# Patient Record
Sex: FEMALE | Race: WHITE | NOT HISPANIC OR LATINO | URBAN - METROPOLITAN AREA
[De-identification: names, ages, dates, MRNs, and addresses within clinical notes are randomized per-mention and may not be internally consistent; named-entity substitution may affect disease eponyms.]

---

## 2018-10-23 ENCOUNTER — EMERGENCY (EMERGENCY)
Facility: HOSPITAL | Age: 18
LOS: 1 days | Discharge: HOME | End: 2018-10-23
Attending: EMERGENCY MEDICINE | Admitting: EMERGENCY MEDICINE
Payer: MEDICAID

## 2018-10-23 ENCOUNTER — OUTPATIENT (OUTPATIENT)
Dept: OUTPATIENT SERVICES | Facility: HOSPITAL | Age: 18
LOS: 1 days | End: 2018-10-23

## 2018-10-23 VITALS
OXYGEN SATURATION: 100 % | TEMPERATURE: 98 F | RESPIRATION RATE: 16 BRPM | HEART RATE: 100 BPM | DIASTOLIC BLOOD PRESSURE: 81 MMHG | SYSTOLIC BLOOD PRESSURE: 135 MMHG

## 2018-10-23 VITALS
DIASTOLIC BLOOD PRESSURE: 36 MMHG | SYSTOLIC BLOOD PRESSURE: 131 MMHG | HEART RATE: 108 BPM | RESPIRATION RATE: 16 BRPM | TEMPERATURE: 98 F | OXYGEN SATURATION: 100 %

## 2018-10-23 DIAGNOSIS — O26.899 OTHER SPECIFIED PREGNANCY RELATED CONDITIONS, UNSPECIFIED TRIMESTER: ICD-10-CM

## 2018-10-23 DIAGNOSIS — Z3A.00 WEEKS OF GESTATION OF PREGNANCY NOT SPECIFIED: ICD-10-CM

## 2018-10-23 LAB
ALBUMIN SERPL ELPH-MCNC: 3.4 G/DL — SIGNIFICANT CHANGE UP (ref 3.3–5)
ALP SERPL-CCNC: 128 U/L — HIGH (ref 40–120)
ALT FLD-CCNC: 20 U/L — SIGNIFICANT CHANGE UP (ref 4–33)
APPEARANCE UR: CLEAR — SIGNIFICANT CHANGE UP
AST SERPL-CCNC: 17 U/L — SIGNIFICANT CHANGE UP (ref 4–32)
B PERT DNA SPEC QL NAA+PROBE: SIGNIFICANT CHANGE UP
BACTERIA # UR AUTO: NEGATIVE — SIGNIFICANT CHANGE UP
BASOPHILS # BLD AUTO: 0.02 K/UL — SIGNIFICANT CHANGE UP (ref 0–0.2)
BASOPHILS NFR BLD AUTO: 0.2 % — SIGNIFICANT CHANGE UP (ref 0–2)
BILIRUB SERPL-MCNC: 0.2 MG/DL — SIGNIFICANT CHANGE UP (ref 0.2–1.2)
BILIRUB UR-MCNC: NEGATIVE — SIGNIFICANT CHANGE UP
BLOOD UR QL VISUAL: NEGATIVE — SIGNIFICANT CHANGE UP
BUN SERPL-MCNC: 5 MG/DL — LOW (ref 7–23)
C PNEUM DNA SPEC QL NAA+PROBE: NOT DETECTED — SIGNIFICANT CHANGE UP
CALCIUM SERPL-MCNC: 9 MG/DL — SIGNIFICANT CHANGE UP (ref 8.4–10.5)
CHLORIDE SERPL-SCNC: 100 MMOL/L — SIGNIFICANT CHANGE UP (ref 98–107)
CO2 SERPL-SCNC: 22 MMOL/L — SIGNIFICANT CHANGE UP (ref 22–31)
COLOR SPEC: SIGNIFICANT CHANGE UP
CREAT SERPL-MCNC: 0.45 MG/DL — LOW (ref 0.5–1.3)
EOSINOPHIL # BLD AUTO: 0.07 K/UL — SIGNIFICANT CHANGE UP (ref 0–0.5)
EOSINOPHIL NFR BLD AUTO: 0.6 % — SIGNIFICANT CHANGE UP (ref 0–6)
FLUAV H1 2009 PAND RNA SPEC QL NAA+PROBE: NOT DETECTED — SIGNIFICANT CHANGE UP
FLUAV H1 RNA SPEC QL NAA+PROBE: NOT DETECTED — SIGNIFICANT CHANGE UP
FLUAV H3 RNA SPEC QL NAA+PROBE: NOT DETECTED — SIGNIFICANT CHANGE UP
FLUAV SUBTYP SPEC NAA+PROBE: SIGNIFICANT CHANGE UP
FLUBV RNA SPEC QL NAA+PROBE: NOT DETECTED — SIGNIFICANT CHANGE UP
GLUCOSE SERPL-MCNC: 111 MG/DL — HIGH (ref 70–99)
GLUCOSE UR-MCNC: NEGATIVE — SIGNIFICANT CHANGE UP
HADV DNA SPEC QL NAA+PROBE: NOT DETECTED — SIGNIFICANT CHANGE UP
HCOV 229E RNA SPEC QL NAA+PROBE: NOT DETECTED — SIGNIFICANT CHANGE UP
HCOV HKU1 RNA SPEC QL NAA+PROBE: NOT DETECTED — SIGNIFICANT CHANGE UP
HCOV NL63 RNA SPEC QL NAA+PROBE: NOT DETECTED — SIGNIFICANT CHANGE UP
HCOV OC43 RNA SPEC QL NAA+PROBE: NOT DETECTED — SIGNIFICANT CHANGE UP
HCT VFR BLD CALC: 35 % — SIGNIFICANT CHANGE UP (ref 34.5–45)
HGB BLD-MCNC: 11.3 G/DL — LOW (ref 11.5–15.5)
HMPV RNA SPEC QL NAA+PROBE: NOT DETECTED — SIGNIFICANT CHANGE UP
HPIV1 RNA SPEC QL NAA+PROBE: NOT DETECTED — SIGNIFICANT CHANGE UP
HPIV2 RNA SPEC QL NAA+PROBE: NOT DETECTED — SIGNIFICANT CHANGE UP
HPIV3 RNA SPEC QL NAA+PROBE: NOT DETECTED — SIGNIFICANT CHANGE UP
HPIV4 RNA SPEC QL NAA+PROBE: NOT DETECTED — SIGNIFICANT CHANGE UP
HYALINE CASTS # UR AUTO: NEGATIVE — SIGNIFICANT CHANGE UP
IMM GRANULOCYTES # BLD AUTO: 0.05 # — SIGNIFICANT CHANGE UP
IMM GRANULOCYTES NFR BLD AUTO: 0.4 % — SIGNIFICANT CHANGE UP (ref 0–1.5)
KETONES UR-MCNC: NEGATIVE — SIGNIFICANT CHANGE UP
LEUKOCYTE ESTERASE UR-ACNC: NEGATIVE — SIGNIFICANT CHANGE UP
LYMPHOCYTES # BLD AUTO: 1.57 K/UL — SIGNIFICANT CHANGE UP (ref 1–3.3)
LYMPHOCYTES # BLD AUTO: 13.8 % — SIGNIFICANT CHANGE UP (ref 13–44)
M PNEUMO DNA SPEC QL NAA+PROBE: NOT DETECTED — SIGNIFICANT CHANGE UP
MCHC RBC-ENTMCNC: 26 PG — LOW (ref 27–34)
MCHC RBC-ENTMCNC: 32.3 % — SIGNIFICANT CHANGE UP (ref 32–36)
MCV RBC AUTO: 80.5 FL — SIGNIFICANT CHANGE UP (ref 80–100)
MONOCYTES # BLD AUTO: 0.6 K/UL — SIGNIFICANT CHANGE UP (ref 0–0.9)
MONOCYTES NFR BLD AUTO: 5.3 % — SIGNIFICANT CHANGE UP (ref 2–14)
NEUTROPHILS # BLD AUTO: 9.1 K/UL — HIGH (ref 1.8–7.4)
NEUTROPHILS NFR BLD AUTO: 79.7 % — HIGH (ref 43–77)
NITRITE UR-MCNC: NEGATIVE — SIGNIFICANT CHANGE UP
NRBC # FLD: 0 — SIGNIFICANT CHANGE UP
PH UR: 6.5 — SIGNIFICANT CHANGE UP (ref 5–8)
PLATELET # BLD AUTO: 345 K/UL — SIGNIFICANT CHANGE UP (ref 150–400)
PMV BLD: 10.4 FL — SIGNIFICANT CHANGE UP (ref 7–13)
POTASSIUM SERPL-MCNC: 4.3 MMOL/L — SIGNIFICANT CHANGE UP (ref 3.5–5.3)
POTASSIUM SERPL-SCNC: 4.3 MMOL/L — SIGNIFICANT CHANGE UP (ref 3.5–5.3)
PROT SERPL-MCNC: 7 G/DL — SIGNIFICANT CHANGE UP (ref 6–8.3)
PROT UR-MCNC: NEGATIVE — SIGNIFICANT CHANGE UP
RBC # BLD: 4.35 M/UL — SIGNIFICANT CHANGE UP (ref 3.8–5.2)
RBC # FLD: 12.9 % — SIGNIFICANT CHANGE UP (ref 10.3–14.5)
RBC CASTS # UR COMP ASSIST: SIGNIFICANT CHANGE UP (ref 0–?)
RSV RNA SPEC QL NAA+PROBE: NOT DETECTED — SIGNIFICANT CHANGE UP
RV+EV RNA SPEC QL NAA+PROBE: POSITIVE — HIGH
SODIUM SERPL-SCNC: 136 MMOL/L — SIGNIFICANT CHANGE UP (ref 135–145)
SP GR SPEC: 1.01 — SIGNIFICANT CHANGE UP (ref 1–1.04)
SQUAMOUS # UR AUTO: SIGNIFICANT CHANGE UP
UROBILINOGEN FLD QL: NORMAL — SIGNIFICANT CHANGE UP
WBC # BLD: 11.41 K/UL — HIGH (ref 3.8–10.5)
WBC # FLD AUTO: 11.41 K/UL — HIGH (ref 3.8–10.5)
WBC UR QL: SIGNIFICANT CHANGE UP (ref 0–?)

## 2018-10-23 PROCEDURE — 99284 EMERGENCY DEPT VISIT MOD MDM: CPT

## 2018-10-23 RX ORDER — IPRATROPIUM/ALBUTEROL SULFATE 18-103MCG
3 AEROSOL WITH ADAPTER (GRAM) INHALATION ONCE
Refills: 0 | Status: COMPLETED | OUTPATIENT
Start: 2018-10-23 | End: 2018-10-23

## 2018-10-23 RX ORDER — SODIUM CHLORIDE 9 MG/ML
1000 INJECTION INTRAMUSCULAR; INTRAVENOUS; SUBCUTANEOUS ONCE
Refills: 0 | Status: COMPLETED | OUTPATIENT
Start: 2018-10-23 | End: 2018-10-23

## 2018-10-23 RX ORDER — ALBUTEROL 90 UG/1
1 AEROSOL, METERED ORAL ONCE
Refills: 0 | Status: COMPLETED | OUTPATIENT
Start: 2018-10-23 | End: 2018-10-23

## 2018-10-23 RX ORDER — ACETAMINOPHEN 500 MG
650 TABLET ORAL ONCE
Refills: 0 | Status: COMPLETED | OUTPATIENT
Start: 2018-10-23 | End: 2018-10-23

## 2018-10-23 RX ADMIN — Medication 75 MILLIGRAM(S): at 09:46

## 2018-10-23 RX ADMIN — SODIUM CHLORIDE 2000 MILLILITER(S): 9 INJECTION INTRAMUSCULAR; INTRAVENOUS; SUBCUTANEOUS at 09:46

## 2018-10-23 RX ADMIN — Medication 650 MILLIGRAM(S): at 10:23

## 2018-10-23 RX ADMIN — Medication 3 MILLILITER(S): at 09:57

## 2018-10-23 RX ADMIN — SODIUM CHLORIDE 2000 MILLILITER(S): 9 INJECTION INTRAMUSCULAR; INTRAVENOUS; SUBCUTANEOUS at 10:23

## 2018-10-23 RX ADMIN — Medication 650 MILLIGRAM(S): at 09:46

## 2018-10-23 RX ADMIN — ALBUTEROL 1 PUFF(S): 90 AEROSOL, METERED ORAL at 12:59

## 2018-10-23 NOTE — ED PROVIDER NOTE - PROGRESS NOTE DETAILS
Jefry att: , dehydrated, will write for additional 2nd liter. Case d/w [facility], request rvp. Jefry att: RVP positive. Per OB Resident 29 wk pregnant patient requires NST. L&D [id number] paged to arrange. L&D requests transfer up to L&D for further eval.

## 2018-10-23 NOTE — ED PROVIDER NOTE - PLAN OF CARE
Seen in ER for cold and flu symptoms. You have tested positive for Rhino/Enterovirus. Take Tylenol 650 mg every 4 hours as needed for fever >100F, weakness or chills. Drink plenty of fluids. Can take Ventolin 2 puff every 4-6 hours as needed for short of breath or persistent cough. See your doctor during the week for persistent symptoms. Return to ER for new or worsening symptoms.

## 2018-10-23 NOTE — ED ADULT TRIAGE NOTE - CHIEF COMPLAINT QUOTE
SAMANTHA 1/3/2019 29 weeks 3 days gestation.  c/o congestion and productive cough, yellow mucous since yesterday.  Denies pregnancy symptoms

## 2018-10-23 NOTE — ED PROVIDER NOTE - CARE PLAN
Principal Discharge DX:	Enterovirus infection  Assessment and plan of treatment:	Seen in ER for cold and flu symptoms. You have tested positive for Rhino/Enterovirus. Take Tylenol 650 mg every 4 hours as needed for fever >100F, weakness or chills. Drink plenty of fluids. Can take Ventolin 2 puff every 4-6 hours as needed for short of breath or persistent cough. See your doctor during the week for persistent symptoms. Return to ER for new or worsening symptoms.

## 2018-10-23 NOTE — ED PROVIDER NOTE - OBJECTIVE STATEMENT
09:08 Jefry att:     SAMANTHA 1/3/2019 29 weeks 3 days gestation.  c/o congestion and productive cough, yellow mucous since yesterday.  Denies pregnancy symptoms 09:08 Jefry att: 18 @ 29 w 3 d c/o nasal congestion and productive cough x 2 days. Patient works in a . Since yesterday subj fever, chills, nasal congestion, rhinorrhea, cough productive white sputum. Came to ER today for dyspnea. Denies asthma or lung disease. Denies pelvic pain, cramp, vag bleed, vag discharge. PMH x PSH x MED not on pnv

## 2018-10-23 NOTE — ED PROVIDER NOTE - MEDICAL DECISION MAKING DETAILS
URI in pregnancy, nad, ctabl PLAN tamiflu, duoneb, tylenol, fluid   Neg pregnancy complaint: UA, fhr

## 2018-10-24 LAB
BACTERIA UR CULT: SIGNIFICANT CHANGE UP
SPECIMEN SOURCE: SIGNIFICANT CHANGE UP

## 2018-12-24 ENCOUNTER — INPATIENT (INPATIENT)
Facility: HOSPITAL | Age: 18
LOS: 3 days | Discharge: HOME | End: 2018-12-28
Attending: SPECIALIST | Admitting: SPECIALIST

## 2018-12-24 VITALS — HEIGHT: 64 IN | WEIGHT: 293 LBS

## 2018-12-24 DIAGNOSIS — Z3A.00 WEEKS OF GESTATION OF PREGNANCY NOT SPECIFIED: ICD-10-CM

## 2018-12-24 DIAGNOSIS — O26.899 OTHER SPECIFIED PREGNANCY RELATED CONDITIONS, UNSPECIFIED TRIMESTER: ICD-10-CM

## 2018-12-24 LAB
ALBUMIN SERPL ELPH-MCNC: 3 G/DL — LOW (ref 3.3–5)
ALP SERPL-CCNC: 228 U/L — HIGH (ref 40–120)
ALT FLD-CCNC: 10 U/L — SIGNIFICANT CHANGE UP (ref 4–33)
AMYLASE P1 CFR SERPL: 60 U/L — SIGNIFICANT CHANGE UP (ref 25–125)
APPEARANCE UR: SIGNIFICANT CHANGE UP
APTT BLD: 32.9 SEC — SIGNIFICANT CHANGE UP (ref 27.5–36.3)
AST SERPL-CCNC: 21 U/L — SIGNIFICANT CHANGE UP (ref 4–32)
BACTERIA # UR AUTO: HIGH
BASOPHILS # BLD AUTO: 0.02 K/UL — SIGNIFICANT CHANGE UP (ref 0–0.2)
BASOPHILS NFR BLD AUTO: 0.2 % — SIGNIFICANT CHANGE UP (ref 0–2)
BILIRUB SERPL-MCNC: 0.4 MG/DL — SIGNIFICANT CHANGE UP (ref 0.2–1.2)
BILIRUB UR-MCNC: NEGATIVE — SIGNIFICANT CHANGE UP
BLD GP AB SCN SERPL QL: NEGATIVE — SIGNIFICANT CHANGE UP
BLOOD UR QL VISUAL: NEGATIVE — SIGNIFICANT CHANGE UP
BUN SERPL-MCNC: 8 MG/DL — SIGNIFICANT CHANGE UP (ref 7–23)
CALCIUM SERPL-MCNC: 9 MG/DL — SIGNIFICANT CHANGE UP (ref 8.4–10.5)
CHLORIDE SERPL-SCNC: 101 MMOL/L — SIGNIFICANT CHANGE UP (ref 98–107)
CO2 SERPL-SCNC: 21 MMOL/L — LOW (ref 22–31)
COLOR SPEC: YELLOW — SIGNIFICANT CHANGE UP
CREAT ?TM UR-MCNC: 267 MG/DL — SIGNIFICANT CHANGE UP
CREAT SERPL-MCNC: 0.61 MG/DL — SIGNIFICANT CHANGE UP (ref 0.5–1.3)
EOSINOPHIL # BLD AUTO: 0.01 K/UL — SIGNIFICANT CHANGE UP (ref 0–0.5)
EOSINOPHIL NFR BLD AUTO: 0.1 % — SIGNIFICANT CHANGE UP (ref 0–6)
FIBRINOGEN PPP-MCNC: 688 MG/DL — HIGH (ref 350–510)
GLUCOSE SERPL-MCNC: 90 MG/DL — SIGNIFICANT CHANGE UP (ref 70–99)
GLUCOSE UR-MCNC: NEGATIVE — SIGNIFICANT CHANGE UP
HCT VFR BLD CALC: 35 % — SIGNIFICANT CHANGE UP (ref 34.5–45)
HGB BLD-MCNC: 10.7 G/DL — LOW (ref 11.5–15.5)
HYALINE CASTS # UR AUTO: HIGH
IMM GRANULOCYTES # BLD AUTO: 0.04 # — SIGNIFICANT CHANGE UP
IMM GRANULOCYTES NFR BLD AUTO: 0.4 % — SIGNIFICANT CHANGE UP (ref 0–1.5)
INR BLD: 0.91 — SIGNIFICANT CHANGE UP (ref 0.88–1.17)
KETONES UR-MCNC: >150 — HIGH
LDH SERPL L TO P-CCNC: 276 U/L — HIGH (ref 135–225)
LEUKOCYTE ESTERASE UR-ACNC: SIGNIFICANT CHANGE UP
LIDOCAIN IGE QN: 63.1 U/L — HIGH (ref 7–60)
LYMPHOCYTES # BLD AUTO: 0.76 K/UL — LOW (ref 1–3.3)
LYMPHOCYTES # BLD AUTO: 6.8 % — LOW (ref 13–44)
MCHC RBC-ENTMCNC: 23.7 PG — LOW (ref 27–34)
MCHC RBC-ENTMCNC: 30.6 % — LOW (ref 32–36)
MCV RBC AUTO: 77.4 FL — LOW (ref 80–100)
MONOCYTES # BLD AUTO: 0.41 K/UL — SIGNIFICANT CHANGE UP (ref 0–0.9)
MONOCYTES NFR BLD AUTO: 3.7 % — SIGNIFICANT CHANGE UP (ref 2–14)
MUCOUS THREADS # UR AUTO: SIGNIFICANT CHANGE UP
NEUTROPHILS # BLD AUTO: 9.92 K/UL — HIGH (ref 1.8–7.4)
NEUTROPHILS NFR BLD AUTO: 88.8 % — HIGH (ref 43–77)
NITRITE UR-MCNC: NEGATIVE — SIGNIFICANT CHANGE UP
NRBC # FLD: 0 — SIGNIFICANT CHANGE UP
PH UR: 6 — SIGNIFICANT CHANGE UP (ref 5–8)
PLATELET # BLD AUTO: 333 K/UL — SIGNIFICANT CHANGE UP (ref 150–400)
PMV BLD: 11.8 FL — SIGNIFICANT CHANGE UP (ref 7–13)
POTASSIUM SERPL-MCNC: 4.1 MMOL/L — SIGNIFICANT CHANGE UP (ref 3.5–5.3)
POTASSIUM SERPL-SCNC: 4.1 MMOL/L — SIGNIFICANT CHANGE UP (ref 3.5–5.3)
PROT SERPL-MCNC: 6.5 G/DL — SIGNIFICANT CHANGE UP (ref 6–8.3)
PROT UR-MCNC: 100 — HIGH
PROT UR-MCNC: 92.8 MG/DL — SIGNIFICANT CHANGE UP
PROTHROM AB SERPL-ACNC: 10.4 SEC — SIGNIFICANT CHANGE UP (ref 9.8–13.1)
RBC # BLD: 4.52 M/UL — SIGNIFICANT CHANGE UP (ref 3.8–5.2)
RBC # FLD: 14.7 % — HIGH (ref 10.3–14.5)
RBC CASTS # UR COMP ASSIST: HIGH (ref 0–?)
RH IG SCN BLD-IMP: POSITIVE — SIGNIFICANT CHANGE UP
RH IG SCN BLD-IMP: POSITIVE — SIGNIFICANT CHANGE UP
SODIUM SERPL-SCNC: 135 MMOL/L — SIGNIFICANT CHANGE UP (ref 135–145)
SP GR SPEC: 1.03 — SIGNIFICANT CHANGE UP (ref 1–1.04)
SQUAMOUS # UR AUTO: SIGNIFICANT CHANGE UP
URATE SERPL-MCNC: 6.3 MG/DL — SIGNIFICANT CHANGE UP (ref 2.5–7)
UROBILINOGEN FLD QL: SIGNIFICANT CHANGE UP
WBC # BLD: 11.16 K/UL — HIGH (ref 3.8–10.5)
WBC # FLD AUTO: 11.16 K/UL — HIGH (ref 3.8–10.5)
WBC UR QL: >50 — HIGH (ref 0–?)

## 2018-12-24 RX ORDER — SODIUM CHLORIDE 9 MG/ML
1000 INJECTION, SOLUTION INTRAVENOUS
Refills: 0 | Status: DISCONTINUED | OUTPATIENT
Start: 2018-12-24 | End: 2018-12-24

## 2018-12-24 RX ORDER — SODIUM CHLORIDE 9 MG/ML
1000 INJECTION, SOLUTION INTRAVENOUS ONCE
Refills: 0 | Status: DISCONTINUED | OUTPATIENT
Start: 2018-12-25 | End: 2018-12-25

## 2018-12-24 RX ORDER — OXYTOCIN 10 UNIT/ML
333.33 VIAL (ML) INJECTION
Qty: 20 | Refills: 0 | Status: DISCONTINUED | OUTPATIENT
Start: 2018-12-24 | End: 2018-12-24

## 2018-12-24 RX ORDER — SODIUM CHLORIDE 9 MG/ML
1000 INJECTION, SOLUTION INTRAVENOUS
Refills: 0 | Status: DISCONTINUED | OUTPATIENT
Start: 2018-12-25 | End: 2018-12-25

## 2018-12-24 RX ORDER — OXYTOCIN 10 UNIT/ML
333.33 VIAL (ML) INJECTION
Qty: 20 | Refills: 0 | Status: DISCONTINUED | OUTPATIENT
Start: 2018-12-25 | End: 2018-12-25

## 2018-12-25 LAB
ALBUMIN SERPL ELPH-MCNC: 2.8 G/DL — LOW (ref 3.3–5)
ALBUMIN SERPL ELPH-MCNC: 2.9 G/DL — LOW (ref 3.3–5)
ALP SERPL-CCNC: 217 U/L — HIGH (ref 40–120)
ALP SERPL-CCNC: 237 U/L — HIGH (ref 40–120)
ALT FLD-CCNC: 11 U/L — SIGNIFICANT CHANGE UP (ref 4–33)
ALT FLD-CCNC: 11 U/L — SIGNIFICANT CHANGE UP (ref 4–33)
APTT BLD: 20.4 SEC — LOW (ref 27.5–36.3)
APTT BLD: 28.4 SEC — SIGNIFICANT CHANGE UP (ref 27.5–36.3)
AST SERPL-CCNC: 18 U/L — SIGNIFICANT CHANGE UP (ref 4–32)
AST SERPL-CCNC: 22 U/L — SIGNIFICANT CHANGE UP (ref 4–32)
BASOPHILS # BLD AUTO: 0.02 K/UL — SIGNIFICANT CHANGE UP (ref 0–0.2)
BASOPHILS # BLD AUTO: 0.02 K/UL — SIGNIFICANT CHANGE UP (ref 0–0.2)
BASOPHILS NFR BLD AUTO: 0.2 % — SIGNIFICANT CHANGE UP (ref 0–2)
BASOPHILS NFR BLD AUTO: 0.2 % — SIGNIFICANT CHANGE UP (ref 0–2)
BILIRUB SERPL-MCNC: 0.3 MG/DL — SIGNIFICANT CHANGE UP (ref 0.2–1.2)
BILIRUB SERPL-MCNC: 0.3 MG/DL — SIGNIFICANT CHANGE UP (ref 0.2–1.2)
BUN SERPL-MCNC: 7 MG/DL — SIGNIFICANT CHANGE UP (ref 7–23)
BUN SERPL-MCNC: 7 MG/DL — SIGNIFICANT CHANGE UP (ref 7–23)
CALCIUM SERPL-MCNC: 8.8 MG/DL — SIGNIFICANT CHANGE UP (ref 8.4–10.5)
CALCIUM SERPL-MCNC: 9.4 MG/DL — SIGNIFICANT CHANGE UP (ref 8.4–10.5)
CHLORIDE SERPL-SCNC: 102 MMOL/L — SIGNIFICANT CHANGE UP (ref 98–107)
CHLORIDE SERPL-SCNC: 103 MMOL/L — SIGNIFICANT CHANGE UP (ref 98–107)
CO2 SERPL-SCNC: 14 MMOL/L — LOW (ref 22–31)
CO2 SERPL-SCNC: 20 MMOL/L — LOW (ref 22–31)
CREAT SERPL-MCNC: 0.55 MG/DL — SIGNIFICANT CHANGE UP (ref 0.5–1.3)
CREAT SERPL-MCNC: 0.57 MG/DL — SIGNIFICANT CHANGE UP (ref 0.5–1.3)
EOSINOPHIL # BLD AUTO: 0.02 K/UL — SIGNIFICANT CHANGE UP (ref 0–0.5)
EOSINOPHIL # BLD AUTO: 0.03 K/UL — SIGNIFICANT CHANGE UP (ref 0–0.5)
EOSINOPHIL NFR BLD AUTO: 0.2 % — SIGNIFICANT CHANGE UP (ref 0–6)
EOSINOPHIL NFR BLD AUTO: 0.3 % — SIGNIFICANT CHANGE UP (ref 0–6)
FIBRINOGEN PPP-MCNC: 850.1 MG/DL — HIGH (ref 350–510)
FIBRINOGEN PPP-MCNC: > 772 MG/DL — HIGH (ref 350–510)
GLUCOSE SERPL-MCNC: 78 MG/DL — SIGNIFICANT CHANGE UP (ref 70–99)
GLUCOSE SERPL-MCNC: 87 MG/DL — SIGNIFICANT CHANGE UP (ref 70–99)
HCT VFR BLD CALC: 31.5 % — LOW (ref 34.5–45)
HCT VFR BLD CALC: 35.3 % — SIGNIFICANT CHANGE UP (ref 34.5–45)
HGB BLD-MCNC: 11.1 G/DL — LOW (ref 11.5–15.5)
HGB BLD-MCNC: 9.8 G/DL — LOW (ref 11.5–15.5)
IMM GRANULOCYTES # BLD AUTO: 0.06 # — SIGNIFICANT CHANGE UP
IMM GRANULOCYTES # BLD AUTO: 0.07 # — SIGNIFICANT CHANGE UP
IMM GRANULOCYTES NFR BLD AUTO: 0.6 % — SIGNIFICANT CHANGE UP (ref 0–1.5)
IMM GRANULOCYTES NFR BLD AUTO: 0.7 % — SIGNIFICANT CHANGE UP (ref 0–1.5)
INR BLD: 0.96 — SIGNIFICANT CHANGE UP (ref 0.88–1.17)
INR BLD: 1.02 — SIGNIFICANT CHANGE UP (ref 0.88–1.17)
LDH SERPL L TO P-CCNC: 209 U/L — SIGNIFICANT CHANGE UP (ref 135–225)
LDH SERPL L TO P-CCNC: 409 U/L — HIGH (ref 135–225)
LYMPHOCYTES # BLD AUTO: 1.28 K/UL — SIGNIFICANT CHANGE UP (ref 1–3.3)
LYMPHOCYTES # BLD AUTO: 1.42 K/UL — SIGNIFICANT CHANGE UP (ref 1–3.3)
LYMPHOCYTES # BLD AUTO: 11.6 % — LOW (ref 13–44)
LYMPHOCYTES # BLD AUTO: 16 % — SIGNIFICANT CHANGE UP (ref 13–44)
MCHC RBC-ENTMCNC: 23.3 PG — LOW (ref 27–34)
MCHC RBC-ENTMCNC: 23.6 PG — LOW (ref 27–34)
MCHC RBC-ENTMCNC: 31.1 % — LOW (ref 32–36)
MCHC RBC-ENTMCNC: 31.4 % — LOW (ref 32–36)
MCV RBC AUTO: 74.9 FL — LOW (ref 80–100)
MCV RBC AUTO: 75 FL — LOW (ref 80–100)
MONOCYTES # BLD AUTO: 0.55 K/UL — SIGNIFICANT CHANGE UP (ref 0–0.9)
MONOCYTES # BLD AUTO: 0.62 K/UL — SIGNIFICANT CHANGE UP (ref 0–0.9)
MONOCYTES NFR BLD AUTO: 5.6 % — SIGNIFICANT CHANGE UP (ref 2–14)
MONOCYTES NFR BLD AUTO: 6.2 % — SIGNIFICANT CHANGE UP (ref 2–14)
NEUTROPHILS # BLD AUTO: 6.82 K/UL — SIGNIFICANT CHANGE UP (ref 1.8–7.4)
NEUTROPHILS # BLD AUTO: 9.02 K/UL — HIGH (ref 1.8–7.4)
NEUTROPHILS NFR BLD AUTO: 76.7 % — SIGNIFICANT CHANGE UP (ref 43–77)
NEUTROPHILS NFR BLD AUTO: 81.7 % — HIGH (ref 43–77)
NRBC # FLD: 0 — SIGNIFICANT CHANGE UP
NRBC # FLD: 0 — SIGNIFICANT CHANGE UP
PLATELET # BLD AUTO: 302 K/UL — SIGNIFICANT CHANGE UP (ref 150–400)
PLATELET # BLD AUTO: 309 K/UL — SIGNIFICANT CHANGE UP (ref 150–400)
PMV BLD: 11.1 FL — SIGNIFICANT CHANGE UP (ref 7–13)
PMV BLD: 11.7 FL — SIGNIFICANT CHANGE UP (ref 7–13)
POTASSIUM SERPL-MCNC: 3.7 MMOL/L — SIGNIFICANT CHANGE UP (ref 3.5–5.3)
POTASSIUM SERPL-MCNC: 4.5 MMOL/L — SIGNIFICANT CHANGE UP (ref 3.5–5.3)
POTASSIUM SERPL-SCNC: 3.7 MMOL/L — SIGNIFICANT CHANGE UP (ref 3.5–5.3)
POTASSIUM SERPL-SCNC: 4.5 MMOL/L — SIGNIFICANT CHANGE UP (ref 3.5–5.3)
PROT SERPL-MCNC: 6 G/DL — SIGNIFICANT CHANGE UP (ref 6–8.3)
PROT SERPL-MCNC: 6.4 G/DL — SIGNIFICANT CHANGE UP (ref 6–8.3)
PROTHROM AB SERPL-ACNC: 10.6 SEC — SIGNIFICANT CHANGE UP (ref 9.8–13.1)
PROTHROM AB SERPL-ACNC: 11.6 SEC — SIGNIFICANT CHANGE UP (ref 9.8–13.1)
RBC # BLD: 4.2 M/UL — SIGNIFICANT CHANGE UP (ref 3.8–5.2)
RBC # BLD: 4.71 M/UL — SIGNIFICANT CHANGE UP (ref 3.8–5.2)
RBC # FLD: 14.9 % — HIGH (ref 10.3–14.5)
RBC # FLD: 15 % — HIGH (ref 10.3–14.5)
SODIUM SERPL-SCNC: 136 MMOL/L — SIGNIFICANT CHANGE UP (ref 135–145)
SODIUM SERPL-SCNC: 138 MMOL/L — SIGNIFICANT CHANGE UP (ref 135–145)
T PALLIDUM AB TITR SER: NEGATIVE — SIGNIFICANT CHANGE UP
URATE SERPL-MCNC: 7.4 MG/DL — HIGH (ref 2.5–7)
URATE SERPL-MCNC: 8 MG/DL — HIGH (ref 2.5–7)
WBC # BLD: 11.04 K/UL — HIGH (ref 3.8–10.5)
WBC # BLD: 8.89 K/UL — SIGNIFICANT CHANGE UP (ref 3.8–10.5)
WBC # FLD AUTO: 11.04 K/UL — HIGH (ref 3.8–10.5)
WBC # FLD AUTO: 8.89 K/UL — SIGNIFICANT CHANGE UP (ref 3.8–10.5)

## 2018-12-25 RX ORDER — AMPICILLIN TRIHYDRATE 250 MG
2 CAPSULE ORAL ONCE
Refills: 0 | Status: COMPLETED | OUTPATIENT
Start: 2018-12-25 | End: 2018-12-25

## 2018-12-25 RX ORDER — BUTORPHANOL TARTRATE 2 MG/ML
2 INJECTION, SOLUTION INTRAMUSCULAR; INTRAVENOUS ONCE
Refills: 0 | Status: DISCONTINUED | OUTPATIENT
Start: 2018-12-25 | End: 2018-12-25

## 2018-12-25 RX ORDER — MAGNESIUM SULFATE 500 MG/ML
4 VIAL (ML) INJECTION ONCE
Refills: 0 | Status: COMPLETED | OUTPATIENT
Start: 2018-12-25 | End: 2018-12-25

## 2018-12-25 RX ORDER — ACETAMINOPHEN 500 MG
975 TABLET ORAL ONCE
Refills: 0 | Status: COMPLETED | OUTPATIENT
Start: 2018-12-25 | End: 2018-12-25

## 2018-12-25 RX ORDER — LABETALOL HCL 100 MG
20 TABLET ORAL ONCE
Refills: 0 | Status: DISCONTINUED | OUTPATIENT
Start: 2018-12-25 | End: 2018-12-25

## 2018-12-25 RX ORDER — OXYTOCIN 10 UNIT/ML
2 VIAL (ML) INJECTION
Qty: 30 | Refills: 0 | Status: DISCONTINUED | OUTPATIENT
Start: 2018-12-25 | End: 2018-12-25

## 2018-12-25 RX ORDER — SODIUM CHLORIDE 9 MG/ML
1000 INJECTION, SOLUTION INTRAVENOUS
Refills: 0 | Status: DISCONTINUED | OUTPATIENT
Start: 2018-12-25 | End: 2018-12-25

## 2018-12-25 RX ORDER — MAGNESIUM SULFATE 500 MG/ML
2 VIAL (ML) INJECTION
Qty: 40 | Refills: 0 | Status: DISCONTINUED | OUTPATIENT
Start: 2018-12-25 | End: 2018-12-25

## 2018-12-25 RX ORDER — SODIUM CHLORIDE 9 MG/ML
1000 INJECTION INTRAMUSCULAR; INTRAVENOUS; SUBCUTANEOUS
Refills: 0 | Status: DISCONTINUED | OUTPATIENT
Start: 2018-12-25 | End: 2018-12-25

## 2018-12-25 RX ORDER — GENTAMICIN SULFATE 40 MG/ML
375 VIAL (ML) INJECTION ONCE
Refills: 0 | Status: COMPLETED | OUTPATIENT
Start: 2018-12-25 | End: 2018-12-25

## 2018-12-25 RX ORDER — AMPICILLIN TRIHYDRATE 250 MG
2 CAPSULE ORAL EVERY 6 HOURS
Refills: 0 | Status: CANCELLED | OUTPATIENT
Start: 2018-12-26 | End: 2018-12-25

## 2018-12-25 RX ORDER — GENTAMICIN SULFATE 40 MG/ML
750 VIAL (ML) INJECTION ONCE
Refills: 0 | Status: DISCONTINUED | OUTPATIENT
Start: 2018-12-25 | End: 2018-12-25

## 2018-12-25 RX ORDER — AMPICILLIN TRIHYDRATE 250 MG
CAPSULE ORAL
Refills: 0 | Status: DISCONTINUED | OUTPATIENT
Start: 2018-12-25 | End: 2018-12-25

## 2018-12-25 RX ORDER — SODIUM CHLORIDE 9 MG/ML
300 INJECTION INTRAMUSCULAR; INTRAVENOUS; SUBCUTANEOUS ONCE
Refills: 0 | Status: DISCONTINUED | OUTPATIENT
Start: 2018-12-25 | End: 2018-12-25

## 2018-12-25 RX ADMIN — Medication 50 GM/HR: at 17:22

## 2018-12-25 RX ADMIN — Medication 216 GRAM(S): at 17:13

## 2018-12-25 RX ADMIN — BUTORPHANOL TARTRATE 2 MILLIGRAM(S): 2 INJECTION, SOLUTION INTRAMUSCULAR; INTRAVENOUS at 09:57

## 2018-12-25 RX ADMIN — SODIUM CHLORIDE 125 MILLILITER(S): 9 INJECTION, SOLUTION INTRAVENOUS at 07:02

## 2018-12-25 RX ADMIN — SODIUM CHLORIDE 125 MILLILITER(S): 9 INJECTION, SOLUTION INTRAVENOUS at 12:34

## 2018-12-25 RX ADMIN — Medication 975 MILLIGRAM(S): at 17:13

## 2018-12-25 RX ADMIN — Medication 250 MILLIGRAM(S): at 20:23

## 2018-12-25 RX ADMIN — Medication 2 MILLIUNIT(S)/MIN: at 17:45

## 2018-12-25 RX ADMIN — SODIUM CHLORIDE 125 MILLILITER(S): 9 INJECTION INTRAMUSCULAR; INTRAVENOUS; SUBCUTANEOUS at 22:27

## 2018-12-25 RX ADMIN — Medication 216 GRAM(S): at 23:31

## 2018-12-25 RX ADMIN — Medication 300 GRAM(S): at 16:58

## 2018-12-25 RX ADMIN — BUTORPHANOL TARTRATE 2 MILLIGRAM(S): 2 INJECTION, SOLUTION INTRAMUSCULAR; INTRAVENOUS at 12:34

## 2018-12-25 RX ADMIN — SODIUM CHLORIDE 50 MILLILITER(S): 9 INJECTION, SOLUTION INTRAVENOUS at 22:27

## 2018-12-25 RX ADMIN — BUTORPHANOL TARTRATE 2 MILLIGRAM(S): 2 INJECTION, SOLUTION INTRAMUSCULAR; INTRAVENOUS at 04:22

## 2018-12-25 RX ADMIN — BUTORPHANOL TARTRATE 2 MILLIGRAM(S): 2 INJECTION, SOLUTION INTRAMUSCULAR; INTRAVENOUS at 06:02

## 2018-12-26 LAB
ALBUMIN SERPL ELPH-MCNC: 2.5 G/DL — LOW (ref 3.3–5)
ALP SERPL-CCNC: 186 U/L — HIGH (ref 40–120)
ALP SERPL-CCNC: 189 U/L — HIGH (ref 40–120)
ALP SERPL-CCNC: 199 U/L — HIGH (ref 40–120)
ALT FLD-CCNC: 11 U/L — SIGNIFICANT CHANGE UP (ref 4–33)
ALT FLD-CCNC: 7 U/L — SIGNIFICANT CHANGE UP (ref 4–33)
ALT FLD-CCNC: 8 U/L — SIGNIFICANT CHANGE UP (ref 4–33)
APTT BLD: 25.2 SEC — LOW (ref 27.5–36.3)
APTT BLD: 28.7 SEC — SIGNIFICANT CHANGE UP (ref 27.5–36.3)
APTT BLD: 29.1 SEC — SIGNIFICANT CHANGE UP (ref 27.5–36.3)
APTT BLD: 30.3 SEC — SIGNIFICANT CHANGE UP (ref 27.5–36.3)
AST SERPL-CCNC: 17 U/L — SIGNIFICANT CHANGE UP (ref 4–32)
AST SERPL-CCNC: 18 U/L — SIGNIFICANT CHANGE UP (ref 4–32)
AST SERPL-CCNC: 19 U/L — SIGNIFICANT CHANGE UP (ref 4–32)
BASOPHILS # BLD AUTO: 0.01 K/UL — SIGNIFICANT CHANGE UP (ref 0–0.2)
BASOPHILS # BLD AUTO: 0.02 K/UL — SIGNIFICANT CHANGE UP (ref 0–0.2)
BASOPHILS NFR BLD AUTO: 0.1 % — SIGNIFICANT CHANGE UP (ref 0–2)
BASOPHILS NFR BLD AUTO: 0.2 % — SIGNIFICANT CHANGE UP (ref 0–2)
BILIRUB SERPL-MCNC: 0.4 MG/DL — SIGNIFICANT CHANGE UP (ref 0.2–1.2)
BUN SERPL-MCNC: 6 MG/DL — LOW (ref 7–23)
BUN SERPL-MCNC: 7 MG/DL — SIGNIFICANT CHANGE UP (ref 7–23)
BUN SERPL-MCNC: 8 MG/DL — SIGNIFICANT CHANGE UP (ref 7–23)
CALCIUM SERPL-MCNC: 8.1 MG/DL — LOW (ref 8.4–10.5)
CALCIUM SERPL-MCNC: 8.1 MG/DL — LOW (ref 8.4–10.5)
CALCIUM SERPL-MCNC: 8.2 MG/DL — LOW (ref 8.4–10.5)
CHLORIDE SERPL-SCNC: 101 MMOL/L — SIGNIFICANT CHANGE UP (ref 98–107)
CHLORIDE SERPL-SCNC: 98 MMOL/L — SIGNIFICANT CHANGE UP (ref 98–107)
CHLORIDE SERPL-SCNC: 98 MMOL/L — SIGNIFICANT CHANGE UP (ref 98–107)
CO2 SERPL-SCNC: 20 MMOL/L — LOW (ref 22–31)
CO2 SERPL-SCNC: 24 MMOL/L — SIGNIFICANT CHANGE UP (ref 22–31)
CO2 SERPL-SCNC: 24 MMOL/L — SIGNIFICANT CHANGE UP (ref 22–31)
CREAT SERPL-MCNC: 0.67 MG/DL — SIGNIFICANT CHANGE UP (ref 0.5–1.3)
CREAT SERPL-MCNC: 0.76 MG/DL — SIGNIFICANT CHANGE UP (ref 0.5–1.3)
CREAT SERPL-MCNC: 0.76 MG/DL — SIGNIFICANT CHANGE UP (ref 0.5–1.3)
EOSINOPHIL # BLD AUTO: 0.01 K/UL — SIGNIFICANT CHANGE UP (ref 0–0.5)
EOSINOPHIL # BLD AUTO: 0.02 K/UL — SIGNIFICANT CHANGE UP (ref 0–0.5)
EOSINOPHIL NFR BLD AUTO: 0.1 % — SIGNIFICANT CHANGE UP (ref 0–6)
EOSINOPHIL NFR BLD AUTO: 0.2 % — SIGNIFICANT CHANGE UP (ref 0–6)
FIBRINOGEN PPP-MCNC: 698.6 MG/DL — HIGH (ref 350–510)
FIBRINOGEN PPP-MCNC: 785 MG/DL — HIGH (ref 350–510)
FIBRINOGEN PPP-MCNC: 850 MG/DL — HIGH (ref 350–510)
FIBRINOGEN PPP-MCNC: 855.4 MG/DL — HIGH (ref 350–510)
GLUCOSE SERPL-MCNC: 100 MG/DL — HIGH (ref 70–99)
GLUCOSE SERPL-MCNC: 104 MG/DL — HIGH (ref 70–99)
GLUCOSE SERPL-MCNC: 106 MG/DL — HIGH (ref 70–99)
HCT VFR BLD CALC: 29.5 % — LOW (ref 34.5–45)
HCT VFR BLD CALC: 30.7 % — LOW (ref 34.5–45)
HCT VFR BLD CALC: 31.3 % — LOW (ref 34.5–45)
HCT VFR BLD CALC: 34.8 % — SIGNIFICANT CHANGE UP (ref 34.5–45)
HGB BLD-MCNC: 10.7 G/DL — LOW (ref 11.5–15.5)
HGB BLD-MCNC: 9.4 G/DL — LOW (ref 11.5–15.5)
HGB BLD-MCNC: 9.5 G/DL — LOW (ref 11.5–15.5)
HGB BLD-MCNC: 9.8 G/DL — LOW (ref 11.5–15.5)
IMM GRANULOCYTES # BLD AUTO: 0.04 # — SIGNIFICANT CHANGE UP
IMM GRANULOCYTES # BLD AUTO: 0.05 # — SIGNIFICANT CHANGE UP
IMM GRANULOCYTES # BLD AUTO: 0.06 # — SIGNIFICANT CHANGE UP
IMM GRANULOCYTES # BLD AUTO: 0.1 # — SIGNIFICANT CHANGE UP
IMM GRANULOCYTES NFR BLD AUTO: 0.4 % — SIGNIFICANT CHANGE UP (ref 0–1.5)
IMM GRANULOCYTES NFR BLD AUTO: 0.4 % — SIGNIFICANT CHANGE UP (ref 0–1.5)
IMM GRANULOCYTES NFR BLD AUTO: 0.5 % — SIGNIFICANT CHANGE UP (ref 0–1.5)
IMM GRANULOCYTES NFR BLD AUTO: 0.9 % — SIGNIFICANT CHANGE UP (ref 0–1.5)
INR BLD: 0.91 — SIGNIFICANT CHANGE UP (ref 0.88–1.17)
INR BLD: 0.93 — SIGNIFICANT CHANGE UP (ref 0.88–1.17)
INR BLD: 0.94 — SIGNIFICANT CHANGE UP (ref 0.88–1.17)
INR BLD: 0.96 — SIGNIFICANT CHANGE UP (ref 0.88–1.17)
LDH SERPL L TO P-CCNC: 261 U/L — HIGH (ref 135–225)
LDH SERPL L TO P-CCNC: 272 U/L — HIGH (ref 135–225)
LDH SERPL L TO P-CCNC: 278 U/L — HIGH (ref 135–225)
LYMPHOCYTES # BLD AUTO: 1.25 K/UL — SIGNIFICANT CHANGE UP (ref 1–3.3)
LYMPHOCYTES # BLD AUTO: 1.28 K/UL — SIGNIFICANT CHANGE UP (ref 1–3.3)
LYMPHOCYTES # BLD AUTO: 1.42 K/UL — SIGNIFICANT CHANGE UP (ref 1–3.3)
LYMPHOCYTES # BLD AUTO: 1.94 K/UL — SIGNIFICANT CHANGE UP (ref 1–3.3)
LYMPHOCYTES # BLD AUTO: 10.4 % — LOW (ref 13–44)
LYMPHOCYTES # BLD AUTO: 12.4 % — LOW (ref 13–44)
LYMPHOCYTES # BLD AUTO: 16.6 % — SIGNIFICANT CHANGE UP (ref 13–44)
LYMPHOCYTES # BLD AUTO: 9.9 % — LOW (ref 13–44)
MAGNESIUM SERPL-MCNC: 3.9 MG/DL — HIGH (ref 1.6–2.6)
MAGNESIUM SERPL-MCNC: 4.1 MG/DL — HIGH (ref 1.6–2.6)
MAGNESIUM SERPL-MCNC: 4.1 MG/DL — HIGH (ref 1.6–2.6)
MAGNESIUM SERPL-MCNC: 4.5 MG/DL — HIGH (ref 1.6–2.6)
MCHC RBC-ENTMCNC: 23.6 PG — LOW (ref 27–34)
MCHC RBC-ENTMCNC: 23.8 PG — LOW (ref 27–34)
MCHC RBC-ENTMCNC: 23.9 PG — LOW (ref 27–34)
MCHC RBC-ENTMCNC: 24 PG — LOW (ref 27–34)
MCHC RBC-ENTMCNC: 30.7 % — LOW (ref 32–36)
MCHC RBC-ENTMCNC: 30.9 % — LOW (ref 32–36)
MCHC RBC-ENTMCNC: 31.3 % — LOW (ref 32–36)
MCHC RBC-ENTMCNC: 31.9 % — LOW (ref 32–36)
MCV RBC AUTO: 75.4 FL — LOW (ref 80–100)
MCV RBC AUTO: 76 FL — LOW (ref 80–100)
MCV RBC AUTO: 76.7 FL — LOW (ref 80–100)
MCV RBC AUTO: 77.3 FL — LOW (ref 80–100)
MONOCYTES # BLD AUTO: 0.59 K/UL — SIGNIFICANT CHANGE UP (ref 0–0.9)
MONOCYTES # BLD AUTO: 0.69 K/UL — SIGNIFICANT CHANGE UP (ref 0–0.9)
MONOCYTES # BLD AUTO: 0.75 K/UL — SIGNIFICANT CHANGE UP (ref 0–0.9)
MONOCYTES # BLD AUTO: 0.83 K/UL — SIGNIFICANT CHANGE UP (ref 0–0.9)
MONOCYTES NFR BLD AUTO: 4.9 % — SIGNIFICANT CHANGE UP (ref 2–14)
MONOCYTES NFR BLD AUTO: 5.8 % — SIGNIFICANT CHANGE UP (ref 2–14)
MONOCYTES NFR BLD AUTO: 6 % — SIGNIFICANT CHANGE UP (ref 2–14)
MONOCYTES NFR BLD AUTO: 7.1 % — SIGNIFICANT CHANGE UP (ref 2–14)
NEUTROPHILS # BLD AUTO: 10.1 K/UL — HIGH (ref 1.8–7.4)
NEUTROPHILS # BLD AUTO: 10.86 K/UL — HIGH (ref 1.8–7.4)
NEUTROPHILS # BLD AUTO: 8.79 K/UL — HIGH (ref 1.8–7.4)
NEUTROPHILS # BLD AUTO: 9.24 K/UL — HIGH (ref 1.8–7.4)
NEUTROPHILS NFR BLD AUTO: 75.1 % — SIGNIFICANT CHANGE UP (ref 43–77)
NEUTROPHILS NFR BLD AUTO: 80.9 % — HIGH (ref 43–77)
NEUTROPHILS NFR BLD AUTO: 83.5 % — HIGH (ref 43–77)
NEUTROPHILS NFR BLD AUTO: 84 % — HIGH (ref 43–77)
NRBC # FLD: 0 — SIGNIFICANT CHANGE UP
PLATELET # BLD AUTO: 309 K/UL — SIGNIFICANT CHANGE UP (ref 150–400)
PLATELET # BLD AUTO: 317 K/UL — SIGNIFICANT CHANGE UP (ref 150–400)
PLATELET # BLD AUTO: 328 K/UL — SIGNIFICANT CHANGE UP (ref 150–400)
PLATELET # BLD AUTO: 353 K/UL — SIGNIFICANT CHANGE UP (ref 150–400)
PMV BLD: 10.3 FL — SIGNIFICANT CHANGE UP (ref 7–13)
PMV BLD: 10.7 FL — SIGNIFICANT CHANGE UP (ref 7–13)
PMV BLD: 11 FL — SIGNIFICANT CHANGE UP (ref 7–13)
PMV BLD: 11.5 FL — SIGNIFICANT CHANGE UP (ref 7–13)
POTASSIUM SERPL-MCNC: 3.6 MMOL/L — SIGNIFICANT CHANGE UP (ref 3.5–5.3)
POTASSIUM SERPL-MCNC: 3.7 MMOL/L — SIGNIFICANT CHANGE UP (ref 3.5–5.3)
POTASSIUM SERPL-MCNC: 3.8 MMOL/L — SIGNIFICANT CHANGE UP (ref 3.5–5.3)
POTASSIUM SERPL-SCNC: 3.6 MMOL/L — SIGNIFICANT CHANGE UP (ref 3.5–5.3)
POTASSIUM SERPL-SCNC: 3.7 MMOL/L — SIGNIFICANT CHANGE UP (ref 3.5–5.3)
POTASSIUM SERPL-SCNC: 3.8 MMOL/L — SIGNIFICANT CHANGE UP (ref 3.5–5.3)
PROT SERPL-MCNC: 5.4 G/DL — LOW (ref 6–8.3)
PROT SERPL-MCNC: 5.4 G/DL — LOW (ref 6–8.3)
PROT SERPL-MCNC: 5.6 G/DL — LOW (ref 6–8.3)
PROTHROM AB SERPL-ACNC: 10.3 SEC — SIGNIFICANT CHANGE UP (ref 9.8–13.1)
PROTHROM AB SERPL-ACNC: 10.4 SEC — SIGNIFICANT CHANGE UP (ref 9.8–13.1)
PROTHROM AB SERPL-ACNC: 10.6 SEC — SIGNIFICANT CHANGE UP (ref 9.8–13.1)
PROTHROM AB SERPL-ACNC: 10.7 SEC — SIGNIFICANT CHANGE UP (ref 9.8–13.1)
RBC # BLD: 3.91 M/UL — SIGNIFICANT CHANGE UP (ref 3.8–5.2)
RBC # BLD: 3.97 M/UL — SIGNIFICANT CHANGE UP (ref 3.8–5.2)
RBC # BLD: 4.12 M/UL — SIGNIFICANT CHANGE UP (ref 3.8–5.2)
RBC # BLD: 4.54 M/UL — SIGNIFICANT CHANGE UP (ref 3.8–5.2)
RBC # FLD: 15.1 % — HIGH (ref 10.3–14.5)
RBC # FLD: 15.2 % — HIGH (ref 10.3–14.5)
RBC # FLD: 15.2 % — HIGH (ref 10.3–14.5)
RBC # FLD: 15.3 % — HIGH (ref 10.3–14.5)
SODIUM SERPL-SCNC: 134 MMOL/L — LOW (ref 135–145)
SODIUM SERPL-SCNC: 134 MMOL/L — LOW (ref 135–145)
SODIUM SERPL-SCNC: 136 MMOL/L — SIGNIFICANT CHANGE UP (ref 135–145)
URATE SERPL-MCNC: 8.6 MG/DL — HIGH (ref 2.5–7)
URATE SERPL-MCNC: 8.7 MG/DL — HIGH (ref 2.5–7)
URATE SERPL-MCNC: 8.7 MG/DL — HIGH (ref 2.5–7)
WBC # BLD: 11.42 K/UL — HIGH (ref 3.8–10.5)
WBC # BLD: 11.69 K/UL — HIGH (ref 3.8–10.5)
WBC # BLD: 12.02 K/UL — HIGH (ref 3.8–10.5)
WBC # BLD: 12.98 K/UL — HIGH (ref 3.8–10.5)
WBC # FLD AUTO: 11.42 K/UL — HIGH (ref 3.8–10.5)
WBC # FLD AUTO: 11.69 K/UL — HIGH (ref 3.8–10.5)
WBC # FLD AUTO: 12.02 K/UL — HIGH (ref 3.8–10.5)
WBC # FLD AUTO: 12.98 K/UL — HIGH (ref 3.8–10.5)

## 2018-12-26 RX ORDER — SODIUM CHLORIDE 9 MG/ML
1000 INJECTION, SOLUTION INTRAVENOUS
Refills: 0 | Status: DISCONTINUED | OUTPATIENT
Start: 2018-12-26 | End: 2018-12-27

## 2018-12-26 RX ORDER — HEPARIN SODIUM 5000 [USP'U]/ML
10000 INJECTION INTRAVENOUS; SUBCUTANEOUS EVERY 8 HOURS
Refills: 0 | Status: DISCONTINUED | OUTPATIENT
Start: 2018-12-26 | End: 2018-12-26

## 2018-12-26 RX ORDER — ACETAMINOPHEN 500 MG
975 TABLET ORAL EVERY 6 HOURS
Refills: 0 | Status: DISCONTINUED | OUTPATIENT
Start: 2018-12-26 | End: 2018-12-28

## 2018-12-26 RX ORDER — HEPARIN SODIUM 5000 [USP'U]/ML
10000 INJECTION INTRAVENOUS; SUBCUTANEOUS EVERY 12 HOURS
Refills: 0 | Status: DISCONTINUED | OUTPATIENT
Start: 2018-12-26 | End: 2018-12-28

## 2018-12-26 RX ORDER — DOCUSATE SODIUM 100 MG
100 CAPSULE ORAL
Refills: 0 | Status: DISCONTINUED | OUTPATIENT
Start: 2018-12-26 | End: 2018-12-28

## 2018-12-26 RX ORDER — DIPHENHYDRAMINE HCL 50 MG
25 CAPSULE ORAL EVERY 4 HOURS
Refills: 0 | Status: DISCONTINUED | OUTPATIENT
Start: 2018-12-26 | End: 2018-12-27

## 2018-12-26 RX ORDER — OXYTOCIN 10 UNIT/ML
16.67 VIAL (ML) INJECTION
Qty: 20 | Refills: 0 | Status: DISCONTINUED | OUTPATIENT
Start: 2018-12-26 | End: 2018-12-26

## 2018-12-26 RX ORDER — OXYCODONE HYDROCHLORIDE 5 MG/1
5 TABLET ORAL
Refills: 0 | Status: DISCONTINUED | OUTPATIENT
Start: 2018-12-26 | End: 2018-12-28

## 2018-12-26 RX ORDER — ONDANSETRON 8 MG/1
4 TABLET, FILM COATED ORAL EVERY 6 HOURS
Refills: 0 | Status: DISCONTINUED | OUTPATIENT
Start: 2018-12-26 | End: 2018-12-27

## 2018-12-26 RX ORDER — GLYCERIN ADULT
1 SUPPOSITORY, RECTAL RECTAL AT BEDTIME
Refills: 0 | Status: DISCONTINUED | OUTPATIENT
Start: 2018-12-26 | End: 2018-12-28

## 2018-12-26 RX ORDER — IBUPROFEN 200 MG
600 TABLET ORAL EVERY 6 HOURS
Refills: 0 | Status: COMPLETED | OUTPATIENT
Start: 2018-12-26 | End: 2019-11-24

## 2018-12-26 RX ORDER — NALOXONE HYDROCHLORIDE 4 MG/.1ML
0.1 SPRAY NASAL
Refills: 0 | Status: DISCONTINUED | OUTPATIENT
Start: 2018-12-26 | End: 2018-12-27

## 2018-12-26 RX ORDER — KETOROLAC TROMETHAMINE 30 MG/ML
30 SYRINGE (ML) INJECTION EVERY 6 HOURS
Refills: 0 | Status: DISCONTINUED | OUTPATIENT
Start: 2018-12-26 | End: 2018-12-26

## 2018-12-26 RX ORDER — HYDROMORPHONE HYDROCHLORIDE 2 MG/ML
1 INJECTION INTRAMUSCULAR; INTRAVENOUS; SUBCUTANEOUS
Refills: 0 | Status: DISCONTINUED | OUTPATIENT
Start: 2018-12-26 | End: 2018-12-27

## 2018-12-26 RX ORDER — MAGNESIUM SULFATE 500 MG/ML
2 VIAL (ML) INJECTION
Qty: 40 | Refills: 0 | Status: DISCONTINUED | OUTPATIENT
Start: 2018-12-26 | End: 2018-12-27

## 2018-12-26 RX ORDER — FERROUS SULFATE 325(65) MG
325 TABLET ORAL DAILY
Refills: 0 | Status: DISCONTINUED | OUTPATIENT
Start: 2018-12-26 | End: 2018-12-28

## 2018-12-26 RX ORDER — OXYTOCIN 10 UNIT/ML
333.33 VIAL (ML) INJECTION
Qty: 20 | Refills: 0 | Status: DISCONTINUED | OUTPATIENT
Start: 2018-12-26 | End: 2018-12-26

## 2018-12-26 RX ORDER — OXYCODONE HYDROCHLORIDE 5 MG/1
5 TABLET ORAL
Refills: 0 | Status: DISCONTINUED | OUTPATIENT
Start: 2018-12-26 | End: 2018-12-27

## 2018-12-26 RX ORDER — SODIUM CHLORIDE 9 MG/ML
1000 INJECTION, SOLUTION INTRAVENOUS
Refills: 0 | Status: DISCONTINUED | OUTPATIENT
Start: 2018-12-26 | End: 2018-12-26

## 2018-12-26 RX ORDER — IBUPROFEN 200 MG
600 TABLET ORAL EVERY 6 HOURS
Refills: 0 | Status: DISCONTINUED | OUTPATIENT
Start: 2018-12-26 | End: 2018-12-28

## 2018-12-26 RX ORDER — DIPHENHYDRAMINE HCL 50 MG
25 CAPSULE ORAL EVERY 6 HOURS
Refills: 0 | Status: DISCONTINUED | OUTPATIENT
Start: 2018-12-26 | End: 2018-12-28

## 2018-12-26 RX ORDER — TETANUS TOXOID, REDUCED DIPHTHERIA TOXOID AND ACELLULAR PERTUSSIS VACCINE, ADSORBED 5; 2.5; 8; 8; 2.5 [IU]/.5ML; [IU]/.5ML; UG/.5ML; UG/.5ML; UG/.5ML
0.5 SUSPENSION INTRAMUSCULAR ONCE
Refills: 0 | Status: DISCONTINUED | OUTPATIENT
Start: 2018-12-26 | End: 2018-12-28

## 2018-12-26 RX ORDER — OXYCODONE HYDROCHLORIDE 5 MG/1
5 TABLET ORAL EVERY 4 HOURS
Refills: 0 | Status: COMPLETED | OUTPATIENT
Start: 2018-12-26 | End: 2019-01-02

## 2018-12-26 RX ORDER — AZITHROMYCIN 500 MG/1
500 TABLET, FILM COATED ORAL ONCE
Refills: 0 | Status: COMPLETED | OUTPATIENT
Start: 2018-12-26 | End: 2018-12-26

## 2018-12-26 RX ORDER — OXYCODONE HYDROCHLORIDE 5 MG/1
10 TABLET ORAL
Refills: 0 | Status: DISCONTINUED | OUTPATIENT
Start: 2018-12-26 | End: 2018-12-27

## 2018-12-26 RX ORDER — SIMETHICONE 80 MG/1
80 TABLET, CHEWABLE ORAL EVERY 4 HOURS
Refills: 0 | Status: DISCONTINUED | OUTPATIENT
Start: 2018-12-26 | End: 2018-12-28

## 2018-12-26 RX ORDER — LABETALOL HCL 100 MG
200 TABLET ORAL THREE TIMES A DAY
Refills: 0 | Status: DISCONTINUED | OUTPATIENT
Start: 2018-12-26 | End: 2018-12-28

## 2018-12-26 RX ORDER — LANOLIN
1 OINTMENT (GRAM) TOPICAL
Refills: 0 | Status: DISCONTINUED | OUTPATIENT
Start: 2018-12-26 | End: 2018-12-28

## 2018-12-26 RX ORDER — OXYCODONE HYDROCHLORIDE 5 MG/1
5 TABLET ORAL EVERY 4 HOURS
Refills: 0 | Status: DISCONTINUED | OUTPATIENT
Start: 2018-12-26 | End: 2018-12-28

## 2018-12-26 RX ADMIN — Medication 600 MILLIGRAM(S): at 18:53

## 2018-12-26 RX ADMIN — HEPARIN SODIUM 10000 UNIT(S): 5000 INJECTION INTRAVENOUS; SUBCUTANEOUS at 12:23

## 2018-12-26 RX ADMIN — SIMETHICONE 80 MILLIGRAM(S): 80 TABLET, CHEWABLE ORAL at 12:22

## 2018-12-26 RX ADMIN — Medication 50 GM/HR: at 04:20

## 2018-12-26 RX ADMIN — Medication 200 MILLIGRAM(S): at 04:09

## 2018-12-26 RX ADMIN — Medication 600 MILLIGRAM(S): at 18:03

## 2018-12-26 RX ADMIN — Medication 50 GM/HR: at 01:29

## 2018-12-26 RX ADMIN — Medication 50 GM/HR: at 07:15

## 2018-12-26 RX ADMIN — AZITHROMYCIN 500 MILLIGRAM(S): 500 TABLET, FILM COATED ORAL at 12:22

## 2018-12-26 RX ADMIN — Medication 50 GM/HR: at 19:32

## 2018-12-26 NOTE — DISCHARGE NOTE OB - CARE PLAN
Principal Discharge DX:	Labor and delivery, indication for care  Goal:	routine, BP control as indicated  Assessment and plan of treatment:	routine

## 2018-12-26 NOTE — PROGRESS NOTE ADULT - SUBJECTIVE AND OBJECTIVE BOX
She is a  18y woman who is now post-operative day: 1    Subjective:  Pt without complaints.  Pain contolled.  +OOB.  Sanchez in place.     No nausea/vomiting.  No flatus.  Lochia WNL.    Objective:  Vital Signs Last 24 Hrs  T(C): 37.2 (26 Dec 2018 06:00), Max: 37.2 (26 Dec 2018 04:35)  T(F): 98.9 (26 Dec 2018 06:00), Max: 99 (26 Dec 2018 04:35)  HR: 93 (26 Dec 2018 06:00) (86 - 102)  BP: 132/50 (26 Dec 2018 06:00) (112/49 - 143/67)  BP(mean): 79 (26 Dec 2018 03:00) (64 - 92)  RR: 20 (26 Dec 2018 06:00) (15 - 23)  SpO2: 97% (26 Dec 2018 06:00) (97% - 100%)    Constitutional: NAD  Abdomen: Soft, non-tender, non-distended.  Uterine fundus firm, non-tender.  Incision: Clean, dry, and intact  Ext: No calf tenderness bilaterally    LABS:                        10.7   11.69 )-----------( 353      ( 26 Dec 2018 00:24 )             34.8                         11.1   11.04 )-----------( 309      ( 25 Dec 2018 14:50 )             35.3                         9.8    8.89  )-----------( 302      ( 25 Dec 2018 05:08 )             31.5         Allergies    No Known Allergies    Intolerances      MEDICATIONS  (STANDING):  acetaminophen   Tablet .. 975 milliGRAM(s) Oral every 6 hours  azithromycin   Tablet 500 milliGRAM(s) Oral once  diphtheria/tetanus/pertussis (acellular) Vaccine (ADAcel) 0.5 milliLiter(s) IntraMuscular once  ferrous    sulfate 325 milliGRAM(s) Oral daily  heparin  Injectable 70772 Unit(s) SubCutaneous every 12 hours  ibuprofen  Tablet. 600 milliGRAM(s) Oral every 6 hours  labetalol 200 milliGRAM(s) Oral three times a day  lactated ringers. 1000 milliLiter(s) (125 mL/Hr) IV Continuous <Continuous>  magnesium sulfate Infusion 2 Gm/Hr (50 mL/Hr) IV Continuous <Continuous>  misoprostol Oral Solution 20 MICROGram(s) Oral every 2 hours  misoprostol Oral Solution 60 MICROGram(s) Oral every 2 hours  misoprostol Oral Solution 60 MICROGram(s) Oral every 2 hours  oxyCODONE    IR 5 milliGRAM(s) Oral every 3 hours  oxytocin Infusion 333.333 milliUNIT(s)/Min (1000 mL/Hr) IV Continuous <Continuous>  oxytocin Infusion 16.667 milliUNIT(s)/Min (50 mL/Hr) IV Continuous <Continuous>  prenatal multivitamin 1 Tablet(s) Oral daily    MEDICATIONS  (PRN):  diphenhydrAMINE 25 milliGRAM(s) Oral every 6 hours PRN Itching  diphenhydrAMINE 25 milliGRAM(s) Oral every 4 hours PRN Pruritus  docusate sodium 100 milliGRAM(s) Oral two times a day PRN Stool Softening  glycerin Suppository - Adult 1 Suppository(s) Rectal at bedtime PRN Constipation  HYDROmorphone  Injectable 1 milliGRAM(s) IV Push every 3 hours PRN Severe Pain (7 - 10)  lanolin Ointment 1 Application(s) Topical every 3 hours PRN Sore Nipples  naloxone Injectable 0.1 milliGRAM(s) IV Push every 3 minutes PRN For ANY of the following changes in patient status:  A. RR LESS THAN 10 breaths per minute, B. Oxygen saturation LESS THAN 90%, C. Sedation score of 6  ondansetron Injectable 4 milliGRAM(s) IV Push every 6 hours PRN Nausea  oxyCODONE    IR 5 milliGRAM(s) Oral every 3 hours PRN Mild Pain (1 - 3)  oxyCODONE    IR 10 milliGRAM(s) Oral every 3 hours PRN Moderate Pain (4 - 6)  oxyCODONE    IR 5 milliGRAM(s) Oral every 4 hours PRN Severe Pain (7 - 10)  simethicone 80 milliGRAM(s) Chew every 4 hours PRN Gas        Assessment and Plan  Pt stable POD #1 s/p  section  -continue routine postoperative and postpartum care  -encourage ambulation  -analgesia prn

## 2018-12-26 NOTE — DISCHARGE NOTE OB - MATERIALS PROVIDED
Vaccinations/[facility]  Screening Program/  Immunization Record/Bottle Feeding Log/Breastfeeding Mother’s Support Group Information/Guide to Postpartum Care/[facility] Hearing Screen Program/Back To Sleep Handout/Shaken Baby Prevention Handout/Breastfeeding Guide and Packet/Birth Certificate Instructions/Discharge Medication Information for Patients and Families Pocket Guide

## 2018-12-26 NOTE — DISCHARGE NOTE OB - CARE PROVIDER_API CALL
Bakari Ch (MD), Obstetrics and Gynecology  [address] [facility]  [address] [place] [address]  Phone: [phone]  Fax: [phone]

## 2018-12-26 NOTE — DISCHARGE NOTE OB - PATIENT PORTAL LINK FT
You can access the [facility] Patient Portal, offered by [facility], by registering with the following website: [email or web address]

## 2018-12-27 RX ADMIN — Medication 600 MILLIGRAM(S): at 00:19

## 2018-12-27 RX ADMIN — Medication 200 MILLIGRAM(S): at 13:56

## 2018-12-27 RX ADMIN — Medication 975 MILLIGRAM(S): at 19:13

## 2018-12-27 RX ADMIN — Medication 600 MILLIGRAM(S): at 01:03

## 2018-12-27 RX ADMIN — Medication 975 MILLIGRAM(S): at 00:19

## 2018-12-27 RX ADMIN — OXYCODONE HYDROCHLORIDE 5 MILLIGRAM(S): 5 TABLET ORAL at 05:31

## 2018-12-27 RX ADMIN — Medication 975 MILLIGRAM(S): at 18:27

## 2018-12-27 RX ADMIN — Medication 975 MILLIGRAM(S): at 01:03

## 2018-12-27 RX ADMIN — HEPARIN SODIUM 10000 UNIT(S): 5000 INJECTION INTRAVENOUS; SUBCUTANEOUS at 12:05

## 2018-12-27 RX ADMIN — Medication 600 MILLIGRAM(S): at 18:28

## 2018-12-27 RX ADMIN — OXYCODONE HYDROCHLORIDE 5 MILLIGRAM(S): 5 TABLET ORAL at 04:57

## 2018-12-27 RX ADMIN — Medication 100 MILLIGRAM(S): at 12:06

## 2018-12-27 RX ADMIN — HEPARIN SODIUM 10000 UNIT(S): 5000 INJECTION INTRAVENOUS; SUBCUTANEOUS at 00:19

## 2018-12-27 RX ADMIN — Medication 975 MILLIGRAM(S): at 12:40

## 2018-12-27 RX ADMIN — Medication 600 MILLIGRAM(S): at 12:05

## 2018-12-27 RX ADMIN — Medication 325 MILLIGRAM(S): at 12:06

## 2018-12-27 RX ADMIN — Medication 600 MILLIGRAM(S): at 19:13

## 2018-12-27 RX ADMIN — Medication 975 MILLIGRAM(S): at 12:06

## 2018-12-27 RX ADMIN — Medication 600 MILLIGRAM(S): at 12:40

## 2018-12-27 RX ADMIN — Medication 1 TABLET(S): at 12:06

## 2018-12-27 NOTE — PROGRESS NOTE ADULT - SUBJECTIVE AND OBJECTIVE BOX
INTERVAL HPI/OVERNIGHT EVENTS:  18y Female s/p c section under epidural anesthesia with duramorph for post op analgesia on 12/26/18  Vital Signs Last 24 Hrs  T(C): 36.6 (27 Dec 2018 13:38), Max: 37 (26 Dec 2018 18:00)  T(F): 97.9 (27 Dec 2018 13:38), Max: 98.6 (26 Dec 2018 18:00)  HR: 112 (27 Dec 2018 13:38) (89 - 112)  BP: 143/98 (27 Dec 2018 13:38) (116/66 - 145/60)  BP(mean): --  RR: 18 (27 Dec 2018 13:38) (18 - 20)  SpO2: 99% (27 Dec 2018 13:38) (98% - 100%)    Patient seen, doing well, no anesthetic complications or complaints noted or reported.  Pain is controlled.    Seen 2 pm.  Standing and walking and smiling.

## 2018-12-27 NOTE — PROGRESS NOTE ADULT - PROBLEM SELECTOR PLAN 1
- Continue regular diet.  - Increase ambulation.  - Continue motrin, tylenol, oxycodone PRN for pain control.   -Continue labetalol 200 TID    Charmaine Sood PGY-1

## 2018-12-27 NOTE — PROGRESS NOTE ADULT - ASSESSMENT
A/P: 17yo  POD#1 s/p pLTCS for NRFHT c/b sPEC s/p Mag .  Patient is stable and doing well post-operatively.

## 2018-12-27 NOTE — PROGRESS NOTE ADULT - SUBJECTIVE AND OBJECTIVE BOX
OB Progress Note: Primary  Delivery, POD#1    S: 19yo  POD#1 s/p pLTCS . Her pain is well controlled. She is tolerating a regular diet and passing flatus. Denies N/V. Denies CP/SOB/lightheadedness/dizziness.   She is ambulating with little difficulty and voiding.     O:   Vital Signs Last 24 Hrs  T(C): 36.6 (27 Dec 2018 05:44), Max: 37 (26 Dec 2018 18:00)  T(F): 97.8 (27 Dec 2018 05:44), Max: 98.6 (26 Dec 2018 18:00)  HR: 89 (27 Dec 2018 05:44) (89 - 102)  BP: 121/60 (27 Dec 2018 05:44) (116/66 - 145/60)  BP(mean): --  RR: 18 (27 Dec 2018 05:44) (18 - 20)  SpO2: 100% (27 Dec 2018 05:44) (98% - 100%)    Labs:  Blood type: B Positive  Rubella IgG: RPR: Negative                          9.8<L>   12.02<H> >-----------< 328    (  @ 17:30 )             31.3<L>                        9.5<L>   11.42<H> >-----------< 317    (  @ 12:10 )             30.7<L>                        9.4<L>   12.98<H> >-----------< 309    (  @ 06:30 )             29.5<L>                        10.7<L>   11.69<H> >-----------< 353    (  @ 00:24 )             34.8                        11.1<L>   11.04<H> >-----------< 309    (  @ 14:50 )             35.3                        9.8<L>   8.89 >-----------< 302    (  @ 05:08 )             31.5<L>                        10.7<L>   11.16<H> >-----------< 333    (  @ 13:01 )             35.0    18 @ 17:30      134<L>  |  98  |  6<L>  ----------------------------<  104<H>  3.6   |  24  |  0.76    18 @ 12:10      134<L>  |  98  |  7   ----------------------------<  100<H>  3.7   |  24  |  0.76    18 @ 06:30      136  |  101  |  8   ----------------------------<  106<H>  3.8   |  20<L>  |  0.67    18 @ 14:50      136  |  103  |  7   ----------------------------<  78  4.5   |  14<L>  |  0.55    18 @ 05:08      138  |  102  |  7   ----------------------------<  87  3.7   |  20<L>  |  0.57    18 @ 13:01      135  |  101  |  8   ----------------------------<  90  4.1   |  21<L>  |  0.61        Ca    8.1<L>      26 Dec 2018 17:30  Ca    8.1<L>      26 Dec 2018 12:10  Ca    8.2<L>      26 Dec 2018 06:30  Ca    9.4      25 Dec 2018 14:50  Ca    8.8      25 Dec 2018 05:08  Ca    9.0      24 Dec 2018 13:01  Mg     4.1<H>     12-26  Mg     4.1<H>     12-26  Mg     3.9<H>     12-26  Mg     4.5<H>     12-25    TPro  5.6<L>  /  Alb  2.5<L>  /  TBili  0.4  /  DBili  x   /  AST  17  /  ALT  8   /  AlkPhos  186<H>  18 @ 17:30  TPro  5.4<L>  /  Alb  2.5<L>  /  TBili  0.4  /  DBili  x   /  AST  19  /  ALT  7   /  AlkPhos  189<H>  18 @ 12:10  TPro  5.4<L>  /  Alb  2.5<L>  /  TBili  0.4  /  DBili  x   /  AST  18  /  ALT  11  /  AlkPhos  199<H>  18 @ 06:30  TPro  6.4  /  Alb  2.8<L>  /  TBili  0.3  /  DBili  x   /  AST  22  /  ALT  11  /  AlkPhos  237<H>  18 @ 14:50  TPro  6.0  /  Alb  2.9<L>  /  TBili  0.3  /  DBili  x   /  AST  18  /  ALT  11  /  AlkPhos  217<H>  18 @ 05:08  TPro  6.5  /  Alb  3.0<L>  /  TBili  0.4  /  DBili  x   /  AST  21  /  ALT  10  /  AlkPhos  228<H>  18 @ 13:01          PE:  General: NAD  Abdomen: Mildly distended, appropriately tender, incision c/d/i.  Extremities: No erythema, no pitting edema

## 2018-12-28 VITALS
OXYGEN SATURATION: 100 % | TEMPERATURE: 98 F | SYSTOLIC BLOOD PRESSURE: 111 MMHG | HEART RATE: 82 BPM | RESPIRATION RATE: 18 BRPM | DIASTOLIC BLOOD PRESSURE: 54 MMHG

## 2018-12-28 RX ADMIN — HEPARIN SODIUM 10000 UNIT(S): 5000 INJECTION INTRAVENOUS; SUBCUTANEOUS at 00:40

## 2018-12-28 RX ADMIN — Medication 600 MILLIGRAM(S): at 00:40

## 2018-12-28 RX ADMIN — Medication 975 MILLIGRAM(S): at 06:33

## 2018-12-28 RX ADMIN — Medication 975 MILLIGRAM(S): at 00:40

## 2018-12-28 RX ADMIN — SIMETHICONE 80 MILLIGRAM(S): 80 TABLET, CHEWABLE ORAL at 00:41

## 2018-12-28 RX ADMIN — Medication 600 MILLIGRAM(S): at 06:33

## 2018-12-28 RX ADMIN — Medication 975 MILLIGRAM(S): at 06:05

## 2018-12-28 RX ADMIN — Medication 600 MILLIGRAM(S): at 01:10

## 2018-12-28 RX ADMIN — Medication 600 MILLIGRAM(S): at 06:05

## 2018-12-28 RX ADMIN — Medication 100 MILLIGRAM(S): at 06:05

## 2018-12-28 RX ADMIN — Medication 975 MILLIGRAM(S): at 01:10

## 2018-12-28 RX ADMIN — SIMETHICONE 80 MILLIGRAM(S): 80 TABLET, CHEWABLE ORAL at 06:05

## 2018-12-28 NOTE — PROGRESS NOTE ADULT - SUBJECTIVE AND OBJECTIVE BOX
She is a  18y woman who is now post-operative day: 2    Subjective:  Pt without complaints.  Pain contolled.  +ambulating.  +void.    Tolerating regular diet.  No nausea/vomiting. Lochia WNL.    Objective:  Vital Signs Last 24 Hrs  T(C): 36.6 (28 Dec 2018 05:58), Max: 36.9 (27 Dec 2018 18:45)  T(F): 97.8 (28 Dec 2018 05:58), Max: 98.5 (28 Dec 2018 01:27)  HR: 82 (28 Dec 2018 05:58) (82 - 112)  BP: 111/54 (28 Dec 2018 05:58) (105/72 - 143/98)  BP(mean): --  RR: 18 (28 Dec 2018 05:58) (18 - 18)  SpO2: 100% (28 Dec 2018 05:58) (99% - 100%)    Constitutional: NAD  Breast: Soft, nontender, not engorged.  Abdomen: Soft, nontender, no distension.  Uterine fundus firm, non-tender.  Incision: Clean, dry, and intact  Ext: No calf tenderness bilaterally    LABS:                        9.8    12.02 )-----------( 328      ( 26 Dec 2018 17:30 )             31.3                         9.5    11.42 )-----------( 317      ( 26 Dec 2018 12:10 )             30.7                         9.4    12.98 )-----------( 309      ( 26 Dec 2018 06:30 )             29.5         Allergies    No Known Allergies    Intolerances      MEDICATIONS  (STANDING):  acetaminophen   Tablet .. 975 milliGRAM(s) Oral every 6 hours  diphtheria/tetanus/pertussis (acellular) Vaccine (ADAcel) 0.5 milliLiter(s) IntraMuscular once  ferrous    sulfate 325 milliGRAM(s) Oral daily  heparin  Injectable 42484 Unit(s) SubCutaneous every 12 hours  ibuprofen  Tablet. 600 milliGRAM(s) Oral every 6 hours  labetalol 200 milliGRAM(s) Oral three times a day  oxyCODONE    IR 5 milliGRAM(s) Oral every 3 hours  prenatal multivitamin 1 Tablet(s) Oral daily    MEDICATIONS  (PRN):  diphenhydrAMINE 25 milliGRAM(s) Oral every 6 hours PRN Itching  docusate sodium 100 milliGRAM(s) Oral two times a day PRN Stool Softening  glycerin Suppository - Adult 1 Suppository(s) Rectal at bedtime PRN Constipation  lanolin Ointment 1 Application(s) Topical every 3 hours PRN Sore Nipples  oxyCODONE    IR 5 milliGRAM(s) Oral every 4 hours PRN Severe Pain (7 - 10)  simethicone 80 milliGRAM(s) Chew every 4 hours PRN Gas        Assessment and Plan  Pt stable POD #2 s/p  section  -continue routine postoperative and postpartum care  -encourage ambulation  -analgesia prn  -discharge planned for today as requested

## 2018-12-28 NOTE — PROGRESS NOTE ADULT - SUBJECTIVE AND OBJECTIVE BOX
OB Progress Note: pTLCS, POD#2    S: 17yo  POD#2 s/p pLTCS. Pain is well controlled. She is tolerating a regular diet and passing flatus. She is voiding spontaneously, and ambulating without difficulty. Denies CP/SOB. Denies lightheadedness/dizziness. Denies N/V.    O:  Vitals:  Vital Signs Last 24 Hrs  T(C): 36.6 (28 Dec 2018 05:58), Max: 36.9 (27 Dec 2018 18:45)  T(F): 97.8 (28 Dec 2018 05:58), Max: 98.5 (28 Dec 2018 01:27)  HR: 82 (28 Dec 2018 05:58) (82 - 112)  BP: 111/54 (28 Dec 2018 05:58) (105/72 - 143/98)  BP(mean): --  RR: 18 (28 Dec 2018 05:58) (18 - 18)  SpO2: 100% (28 Dec 2018 05:58) (99% - 100%)    MEDICATIONS  (STANDING):  acetaminophen   Tablet .. 975 milliGRAM(s) Oral every 6 hours  diphtheria/tetanus/pertussis (acellular) Vaccine (ADAcel) 0.5 milliLiter(s) IntraMuscular once  ferrous    sulfate 325 milliGRAM(s) Oral daily  heparin  Injectable 26555 Unit(s) SubCutaneous every 12 hours  ibuprofen  Tablet. 600 milliGRAM(s) Oral every 6 hours  labetalol 200 milliGRAM(s) Oral three times a day  oxyCODONE    IR 5 milliGRAM(s) Oral every 3 hours  prenatal multivitamin 1 Tablet(s) Oral daily      MEDICATIONS  (PRN):  diphenhydrAMINE 25 milliGRAM(s) Oral every 6 hours PRN Itching  docusate sodium 100 milliGRAM(s) Oral two times a day PRN Stool Softening  glycerin Suppository - Adult 1 Suppository(s) Rectal at bedtime PRN Constipation  lanolin Ointment 1 Application(s) Topical every 3 hours PRN Sore Nipples  oxyCODONE    IR 5 milliGRAM(s) Oral every 4 hours PRN Severe Pain (7 - 10)  simethicone 80 milliGRAM(s) Chew every 4 hours PRN Gas      Labs:  Blood type: B Positive  Rubella IgG: RPR: Negative                          9.8<L>   12.02<H> >-----------< 328    ( 12-26 @ 17:30 )             31.3<L>                        9.5<L>   11.42<H> >-----------< 317    ( 12-26 @ 12:10 )             30.7<L>                        9.4<L>   12.98<H> >-----------< 309    ( 12-26 @ 06:30 )             29.5<L>                        10.7<L>   11.69<H> >-----------< 353    ( 12-26 @ 00:24 )             34.8                        11.1<L>   11.04<H> >-----------< 309    ( 12-25 @ 14:50 )             35.3    12-26-18 @ 17:30      134<L>  |  98  |  6<L>  ----------------------------<  104<H>  3.6   |  24  |  0.76    12-26-18 @ 12:10      134<L>  |  98  |  7   ----------------------------<  100<H>  3.7   |  24  |  0.76    12-26-18 @ 06:30      136  |  101  |  8   ----------------------------<  106<H>  3.8   |  20<L>  |  0.67    12-25-18 @ 14:50      136  |  103  |  7   ----------------------------<  78  4.5   |  14<L>  |  0.55        Ca    8.1<L>      26 Dec 2018 17:30  Ca    8.1<L>      26 Dec 2018 12:10  Ca    8.2<L>      26 Dec 2018 06:30  Ca    9.4      25 Dec 2018 14:50  Mg     4.1<H>     12-26  Mg     4.1<H>     12-26  Mg     3.9<H>     12-26  Mg     4.5<H>     12-25    TPro  5.6<L>  /  Alb  2.5<L>  /  TBili  0.4  /  DBili  x   /  AST  17  /  ALT  8   /  AlkPhos  186<H>  12-26-18 @ 17:30  TPro  5.4<L>  /  Alb  2.5<L>  /  TBili  0.4  /  DBili  x   /  AST  19  /  ALT  7   /  AlkPhos  189<H>  12-26-18 @ 12:10  TPro  5.4<L>  /  Alb  2.5<L>  /  TBili  0.4  /  DBili  x   /  AST  18  /  ALT  11  /  AlkPhos  199<H>  12-26-18 @ 06:30  TPro  6.4  /  Alb  2.8<L>  /  TBili  0.3  /  DBili  x   /  AST  22  /  ALT  11  /  AlkPhos  237<H>  12-25-18 @ 14:50          PE:  General: NAD  Abdomen: Soft, appropriately tender, incision c/d/i.  Extremities: No erythema, no pitting edema

## 2018-12-28 NOTE — PROGRESS NOTE ADULT - PROBLEM SELECTOR PLAN 1
- Continue regular diet.  - Increase ambulation.  - Continue motrin, tylenol, oxycodone PRN for pain control.  -Continue labetalol 200 TID  -Discharge planning- patient wishes to go home today    Charmaine Sood PGY-1

## 2019-09-21 NOTE — PATIENT PROFILE OB - NS_PRENATALLABSOURCEGBS1PN_OBGYN_ALL_OB
medications for this encounter. No current outpatient medications on file. Nursing Notes Reviewed    VITAL SIGNS:  ED Triage Vitals   Enc Vitals Group      BP       Pulse       Resp       Temp       Temp src       SpO2       Weight       Height       Head Circumference       Peak Flow       Pain Score       Pain Loc       Pain Edu? Excl. in [address]? PHYSICAL EXAM:  Physical Exam   Constitutional: She is oriented to person, place, and time. She appears well-developed and well-nourished. She is active and cooperative. Non-toxic appearance. She does not have a sickly appearance. She does not appear ill. No distress. HENT:   Head: Normocephalic and atraumatic. Right Ear: External ear normal.   Left Ear: External ear normal.   Mouth/Throat: Oropharynx is clear and moist. No oropharyngeal exudate. Eyes: Pupils are equal, round, and reactive to light. Conjunctivae and EOM are normal. Right eye exhibits no discharge. Left eye exhibits no discharge. No scleral icterus. Neck: Normal range of motion. No JVD present. Cardiovascular: Normal rate, regular rhythm, normal heart sounds and intact distal pulses. Exam reveals no gallop and no friction rub. No murmur heard. Pulmonary/Chest: Effort normal and breath sounds normal. No stridor. No respiratory distress. She has no wheezes. She has no rales. Abdominal: Soft. She exhibits no distension and no mass. There is no tenderness. There is no rebound and no guarding. Musculoskeletal: Normal range of motion. She exhibits no edema, tenderness or deformity. Neurological: She is alert and oriented to person, place, and time. She has normal strength. She is not disoriented. She displays no atrophy and no tremor. No cranial nerve deficit or sensory deficit. She exhibits normal muscle tone. She displays no seizure activity. Coordination normal. GCS eye subscore is 4. GCS verbal subscore is 5. GCS motor subscore is 6. Skin: Skin is warm.  No rash high school unknown

## 2022-03-14 NOTE — PATIENT PROFILE OB - AMNIOTIC FLUID ODOR, LABOR
normal Clindamycin Counseling: I counseled the patient regarding use of clindamycin as an antibiotic for prophylactic and/or therapeutic purposes. Clindamycin is active against numerous classes of bacteria, including skin bacteria. Side effects may include nausea, diarrhea, gastrointestinal upset, rash, hives, yeast infections, and in rare cases, colitis.

## 2022-05-05 ENCOUNTER — EMERGENCY (EMERGENCY)
Facility: HOSPITAL | Age: 22
LOS: 1 days | Discharge: HOME | End: 2022-05-05
Attending: EMERGENCY MEDICINE | Admitting: EMERGENCY MEDICINE
Payer: MEDICAID

## 2022-05-05 VITALS
DIASTOLIC BLOOD PRESSURE: 62 MMHG | RESPIRATION RATE: 18 BRPM | TEMPERATURE: 98 F | OXYGEN SATURATION: 100 % | HEIGHT: 64 IN | HEART RATE: 100 BPM | SYSTOLIC BLOOD PRESSURE: 138 MMHG

## 2022-05-05 VITALS
OXYGEN SATURATION: 100 % | RESPIRATION RATE: 18 BRPM | SYSTOLIC BLOOD PRESSURE: 141 MMHG | DIASTOLIC BLOOD PRESSURE: 72 MMHG | HEART RATE: 96 BPM | TEMPERATURE: 98 F

## 2022-05-05 LAB
FLUAV AG NPH QL: SIGNIFICANT CHANGE UP
FLUBV AG NPH QL: SIGNIFICANT CHANGE UP
HETEROPH AB TITR SER AGGL: NEGATIVE — SIGNIFICANT CHANGE UP
RSV RNA NPH QL NAA+NON-PROBE: SIGNIFICANT CHANGE UP
S PYO DNA THROAT QL NAA+PROBE: ABNORMAL
SARS-COV-2 RNA SPEC QL NAA+PROBE: SIGNIFICANT CHANGE UP

## 2022-05-05 PROCEDURE — 99284 EMERGENCY DEPT VISIT MOD MDM: CPT

## 2022-05-05 RX ORDER — ACETAMINOPHEN 500 MG
650 TABLET ORAL ONCE
Refills: 0 | Status: COMPLETED | OUTPATIENT
Start: 2022-05-05 | End: 2022-05-05

## 2022-05-05 RX ORDER — LIDOCAINE 4 G/100G
15 CREAM TOPICAL ONCE
Refills: 0 | Status: COMPLETED | OUTPATIENT
Start: 2022-05-05 | End: 2022-05-05

## 2022-05-05 RX ADMIN — LIDOCAINE 15 MILLILITER(S): 4 CREAM TOPICAL at 05:05

## 2022-05-05 RX ADMIN — Medication 650 MILLIGRAM(S): at 09:05

## 2022-05-05 NOTE — ED PROVIDER NOTE - OBJECTIVE STATEMENT
21yo F no pmh  @ 27wks gestation presenting to ED with 1 day of progressive L sided jaw and throat pain a/w difficulty swallowing. No difficulty breathing or coughing, no change in voice, no fevers/chills or n/v. Denies any recent sick contacts. Has been taking tylenol q4 with some improvement in pain. 1st pregnancy complicated by pre-eclampsia with emergent , this pregnancy with only hypertension, recently started labetalol TID. No vaginal bleeding, dysuria or discharge.

## 2022-05-05 NOTE — ED PROVIDER NOTE - INTERNATIONAL TRAVEL
Cigarette Smoking and Your Health, Ambulatory Care   GENERAL INFORMATION:   What are the risks to my health if I smoke? Chemicals in tobacco are addictive and damage every cell in your body  All tobacco products are dangerous to you and to nonsmokers who breathe your secondhand smoke  Even if you are a light smoker or a social smoker, you have an increased risk for cancer, heart disease, and lung disease  If you are pregnant or have diabetes, smoking increases your risk for complications  What are the benefits to my health if I stop smoking? · Lower risk of cancer, heart disease, blood clots, heart attack, and stroke    · Lower risk of diabetes complications, such as kidney, artery, or eye disease, and nerve damage that can result in amputations    · Lower risk of lung infections and diseases, such as pneumonia, asthma, chronic bronchitis, and emphysema           · Increased benefits of chemotherapy if you already have cancer, and decreased risk for cancer returning after treatment    · Improved circulation, allowing more oxygen to be delivered to your body    · Improved ability to heal and to fight infections  What are the benefits to the health of others if I stop smoking? · Lower risk of lung cancer and heart disease in nonsmokers    · Lower risk of miscarriage, early delivery, low birth weight, and stillbirth    · Lower risk of SIDS, obesity, developmental delay, ear infections, colds, pneumonia, bronchitis, asthma, and ADHD in your child  Where can I find more information and support to stop smoking? It is never too late to stop smoking  Ask your healthcare provider for more information if you need help  · Smokefree  [employer]  Phone: 3- 740 - [phone]  Web Address: [employer]  Follow up with your healthcare provider as directed:  Write down your questions so you remember to ask them during your visits  CARE AGREEMENT:   You have the right to help plan your care   Learn about your health condition and how it may be treated  Discuss treatment options with your caregivers to decide what care you want to receive  You always have the right to refuse treatment  The above information is an  only  It is not intended as medical advice for individual conditions or treatments  Talk to your doctor, nurse or pharmacist before following any medical regimen to see if it is safe and effective for you  © 2014 [address] is for End User's use only and may not be sold, redistributed or otherwise used for commercial purposes  All illustrations and images included in CareNotes® are the copyrighted property of A D A M [employer] Inc  or Basilio Cheng  No

## 2022-05-05 NOTE — ED PROVIDER NOTE - PHYSICAL EXAMINATION
GENERAL: non-toxic appearing, alert, in NAD  HEENT: uvula midline, tonsils enlarged b/l with no exudates appreciable, mild trismus, TTP on L lateral tongue and tooth 17 along gum line with no appreciable fluctuance or visualized caries. Head atraumatic, normocephalic, Vision grossly intact, no conjunctivitis or discharge, hearing grossly intact,  no nasal discharge, epistaxis, no horseness of voice no stridor  CARDIAC: RRR, normal S1S2,  no appreciable murmurs, no cyanosis, cap refill < 2 seconds  PULM: normal work of breathing, oxygen saturation on RA wnl, CTAB, no crackles, rales, rhonchi, or wheezing  GI: abdomen nondistended, soft, nontender, no guarding or rebound tenderness, no palpable masses  : Fetal HR 160x3 on doppler  NEURO: awake and alert, follows commands, normal speech, PERRLA, EOMI, no focal motor or sensory deficits  MSK: spine appears normal, no joint swelling or erythema, ranging all extremities with no appreciable loss of ROM  EXT: no peripheral edema, calf tenderness, redness or swelling  SKIN: warm, dry, and intact, no rashes  PSYCH: appropriate mood and affect

## 2022-05-05 NOTE — ED ADULT TRIAGE NOTE - CHIEF COMPLAINT QUOTE
Pt c/o left sided jaw pain, throat pain, headache x1 day. Reports approx 27 week pregnant. PMHX Gestational HTN on labetalol. Denies vision changes, dizziness, n/v, fever, chills.

## 2022-05-05 NOTE — ED PROVIDER NOTE - NSFOLLOWUPCLINICS_GEN_ALL_ED_FT
[facility] - Primary Care  Primary Care  [address] [place] [address]  Phone: [phone]  Fax:

## 2022-05-05 NOTE — ED PROVIDER NOTE - NSFOLLOWUPINSTRUCTIONS_ED_ALL_ED_FT
You were tested today for mononucleosis and strep throat. You also received a viral swab for flu and COVID-19    These results will result within the next 24-48 hours, please call in to follow the results.     Consider regular rinses with salt water to help with throat irritaiton    Continue taking all your other medications as prescribed    Please return to the Emergency Department should you experience any of the following:  - Chest pain, Palpitations, Painful breathing  - Worsening or persistent shortness of breath  - Fever, unexplained weight loss, night sweats  - Blood in stool or in urine  - New weakness, fatigue, or fainting  - Any new concerning symptoms You were tested today for mononucleosis and strep throat. You also received a viral swab for flu and COVID-19    These results will result within the next 24-48 hours, please call in to follow the results.     Consider regular rinses with salt water to help with throat irritation    Continue taking all your other medications as prescribed    Please return to the Emergency Department should you experience any of the following:  - Chest pain, Palpitations, Painful breathing  - Worsening or persistent shortness of breath  - Fever, unexplained weight loss, night sweats  - Blood in stool or in urine  - New weakness, fatigue, or fainting  - Any new concerning symptoms

## 2022-05-05 NOTE — ED PROVIDER NOTE - CLINICAL SUMMARY MEDICAL DECISION MAKING FREE TEXT BOX
Well appearing 21yo F 27wks  with throat pain. Enlarged tonsils, tender L molars and L tongue with no airway compromise, apparent abscesses or caries. Afebrile, stable vitals  - Strep and Mono screening, viscous lido for symptomatic control  - COVID-19 Screen  - FHR wnl  - Well appearing will likely d/c with f/u of results tomorrow

## 2022-05-05 NOTE — ED PROVIDER NOTE - PATIENT PORTAL LINK FT
You can access the FollowMyHealth Patient Portal offered by [facility] by registering at the following website: [email or web address]. By joining [employer]’s FollowMyHealth portal, you will also be able to view your health information using other applications (apps) compatible with our system.

## 2022-05-05 NOTE — ED PROVIDER NOTE - PROGRESS NOTE DETAILS
Dr. Galarza: pt signed out to me at 7 AM from Dr. Steven, awaiting NST from OBGYN; pt 27 weeks pregnant and in ED with sore throat, no vaginal bleeding or OBGYN complaints; on my eval pt with throat pain, but no exudates, +enlarged tonsils; pt would like to try PO acetaminophen for pain Pallavi Mays MD (PGY2) -  Pt seen & reassessed.  Pt symptomatically improved.  NAD, pt tolerated PO & ambulated w/steady unassisted gait in the ED.  We discussed the results of ED w/u w/patient (incl. presumptive Emergency Department dx, associated anticipatory guidance, stressing importance of prompt f/u, return precautions), & gave them a copy of results.  Patient verbalized understanding of ED course & agreed with our f/u recommendations, has decisional making capacity.  Pt st they will f/u w/PMD and OB. Pt agrees to return to the ED if there is any worsening or concerning symptoms. Pallavi Mays MD (PGY2) -  Per OB resident Sofia rm

## 2022-05-05 NOTE — ED ADULT NURSE NOTE - OBJECTIVE STATEMENT
Pt received to room 26. Pt A&Ox4, ambulatory at baseline. Pmh of preeclampsia and gestational HTN controlled with BP medication. Pt 27 weeks pregnant. Pt c/o 1x day L sided jaw pain, throat pain associated with difficulty swallowing. Pt denies any recent travels or sick contacts. Pt able to speak full sentences, no drooling noted, no fevers, chills, diarrhea, nausea, vomiting. Pt endorses at times when lying down experiencing intermittent SOB relieved after sitting up. Pt states she has been taking tylenol every 4 hours with minimal pain relief. Pt denies chest pain, nausea, vomiting, diarrhea, fevers, chills, headaches, dizziness, numbness/tingling to hands/feet, and visual changes. Pt vitally stable at this time. Pt resp even unlabored, breath sounds clear anterior and posterior, abd soft nontender, pedal pulses 2+ bilaterally.  Labs collected and sent. Medicated as per MD. Awaiting further orders at this time.

## 2022-05-05 NOTE — ED ADULT NURSE REASSESSMENT NOTE - NS ED NURSE REASSESS COMMENT FT1
Pt alert and orientdx4, continues to c/o throat pain, Left sided face pain, and left sided neck pain non radiating further. will make MD aware. PERRL, no neuro deficit, able to move all extremities, no visual changes, and no HA, call bell with in reach, will continue to monitor. Pt alert and orientdx4, continues to c/o throat pain, Left sided face pain, and left sided neck pain, no numbness, no tingling, non radiating further. will make MD aware. PERRL, no neuro deficit, able to move all extremities, no visual changes, and no HA, call bell with in reach, will continue to monitor.

## 2022-05-05 NOTE — ED PROVIDER NOTE - ATTENDING CONTRIBUTION TO CARE
23 yo  @ 27 weeks with 1 day of throat pain that has become severe and constant and located to the L neck.  There is associated pain with swallowing.  No fevers or chills.    Gen: Well appearing in NAD  Head: NC/AT  ENT:  Post clear large B symmetric tonsils without exudate.  Neck: trachea midline L anterior cervical tenderness  Resp:  No distress  Ext: no deformities  Neuro:  A&O appears non focal  Skin:  Warm and dry as visualized  Psych:  Normal affect and mood     21yo  @ 27 weeks with throat pain.  Differential includes but not limited to viral pharyngitis, bacterial pharyngitis, mono.  Tolerating secretions.  Low suspicion for PTA.  No imaging warranted at this time.  Will treat symptomatically with steroids.  Will hold ABx at this time as viral most likely.

## 2022-05-07 ENCOUNTER — INPATIENT (INPATIENT)
Facility: HOSPITAL | Age: 22
LOS: 0 days | Discharge: HOME | End: 2022-05-08
Attending: SPECIALIST | Admitting: SPECIALIST
Payer: MEDICAID

## 2022-05-07 VITALS
SYSTOLIC BLOOD PRESSURE: 138 MMHG | HEART RATE: 106 BPM | OXYGEN SATURATION: 99 % | HEIGHT: 64 IN | TEMPERATURE: 98 F | DIASTOLIC BLOOD PRESSURE: 89 MMHG | RESPIRATION RATE: 16 BRPM

## 2022-05-07 DIAGNOSIS — J36 PERITONSILLAR ABSCESS: ICD-10-CM

## 2022-05-07 LAB
ALBUMIN SERPL ELPH-MCNC: 3.7 G/DL — SIGNIFICANT CHANGE UP (ref 3.3–5)
ALP SERPL-CCNC: 101 U/L — SIGNIFICANT CHANGE UP (ref 40–120)
ALT FLD-CCNC: 30 U/L — SIGNIFICANT CHANGE UP (ref 4–33)
ANION GAP SERPL CALC-SCNC: 14 MMOL/L — SIGNIFICANT CHANGE UP (ref 7–14)
AST SERPL-CCNC: 29 U/L — SIGNIFICANT CHANGE UP (ref 4–32)
BASOPHILS # BLD AUTO: 0.02 K/UL — SIGNIFICANT CHANGE UP (ref 0–0.2)
BASOPHILS NFR BLD AUTO: 0.2 % — SIGNIFICANT CHANGE UP (ref 0–2)
BILIRUB SERPL-MCNC: 0.4 MG/DL — SIGNIFICANT CHANGE UP (ref 0.2–1.2)
BUN SERPL-MCNC: 6 MG/DL — LOW (ref 7–23)
CALCIUM SERPL-MCNC: 9.4 MG/DL — SIGNIFICANT CHANGE UP (ref 8.4–10.5)
CHLORIDE SERPL-SCNC: 100 MMOL/L — SIGNIFICANT CHANGE UP (ref 98–107)
CO2 SERPL-SCNC: 23 MMOL/L — SIGNIFICANT CHANGE UP (ref 22–31)
CREAT SERPL-MCNC: 0.5 MG/DL — SIGNIFICANT CHANGE UP (ref 0.5–1.3)
EGFR: 134 ML/MIN/1.73M2 — SIGNIFICANT CHANGE UP
EOSINOPHIL # BLD AUTO: 0.05 K/UL — SIGNIFICANT CHANGE UP (ref 0–0.5)
EOSINOPHIL NFR BLD AUTO: 0.4 % — SIGNIFICANT CHANGE UP (ref 0–6)
GLUCOSE SERPL-MCNC: 94 MG/DL — SIGNIFICANT CHANGE UP (ref 70–99)
HCT VFR BLD CALC: 35.8 % — SIGNIFICANT CHANGE UP (ref 34.5–45)
HGB BLD-MCNC: 11.5 G/DL — SIGNIFICANT CHANGE UP (ref 11.5–15.5)
IANC: 10.44 K/UL — HIGH (ref 1.8–7.4)
IMM GRANULOCYTES NFR BLD AUTO: 0.5 % — SIGNIFICANT CHANGE UP (ref 0–1.5)
LYMPHOCYTES # BLD AUTO: 1.82 K/UL — SIGNIFICANT CHANGE UP (ref 1–3.3)
LYMPHOCYTES # BLD AUTO: 13.9 % — SIGNIFICANT CHANGE UP (ref 13–44)
MCHC RBC-ENTMCNC: 25.9 PG — LOW (ref 27–34)
MCHC RBC-ENTMCNC: 32.1 GM/DL — SIGNIFICANT CHANGE UP (ref 32–36)
MCV RBC AUTO: 80.6 FL — SIGNIFICANT CHANGE UP (ref 80–100)
MONOCYTES # BLD AUTO: 0.75 K/UL — SIGNIFICANT CHANGE UP (ref 0–0.9)
MONOCYTES NFR BLD AUTO: 5.7 % — SIGNIFICANT CHANGE UP (ref 2–14)
NEUTROPHILS # BLD AUTO: 10.44 K/UL — HIGH (ref 1.8–7.4)
NEUTROPHILS NFR BLD AUTO: 79.3 % — HIGH (ref 43–77)
NRBC # BLD: 0 /100 WBCS — SIGNIFICANT CHANGE UP
NRBC # FLD: 0 K/UL — SIGNIFICANT CHANGE UP
PLATELET # BLD AUTO: 363 K/UL — SIGNIFICANT CHANGE UP (ref 150–400)
POTASSIUM SERPL-MCNC: 4.3 MMOL/L — SIGNIFICANT CHANGE UP (ref 3.5–5.3)
POTASSIUM SERPL-SCNC: 4.3 MMOL/L — SIGNIFICANT CHANGE UP (ref 3.5–5.3)
PROT SERPL-MCNC: 8.3 G/DL — SIGNIFICANT CHANGE UP (ref 6–8.3)
RBC # BLD: 4.44 M/UL — SIGNIFICANT CHANGE UP (ref 3.8–5.2)
RBC # FLD: 13.5 % — SIGNIFICANT CHANGE UP (ref 10.3–14.5)
SARS-COV-2 RNA SPEC QL NAA+PROBE: SIGNIFICANT CHANGE UP
SODIUM SERPL-SCNC: 137 MMOL/L — SIGNIFICANT CHANGE UP (ref 135–145)
WBC # BLD: 13.14 K/UL — HIGH (ref 3.8–10.5)
WBC # FLD AUTO: 13.14 K/UL — HIGH (ref 3.8–10.5)

## 2022-05-07 PROCEDURE — 70490 CT SOFT TISSUE NECK W/O DYE: CPT | Mod: 26,MA

## 2022-05-07 PROCEDURE — 99285 EMERGENCY DEPT VISIT HI MDM: CPT

## 2022-05-07 RX ORDER — LIDOCAINE 4 G/100G
10 CREAM TOPICAL
Refills: 0 | Status: DISCONTINUED | OUTPATIENT
Start: 2022-05-07 | End: 2022-05-08

## 2022-05-07 RX ORDER — AMPICILLIN SODIUM AND SULBACTAM SODIUM 250; 125 MG/ML; MG/ML
3 INJECTION, POWDER, FOR SUSPENSION INTRAMUSCULAR; INTRAVENOUS ONCE
Refills: 0 | Status: COMPLETED | OUTPATIENT
Start: 2022-05-07 | End: 2022-05-07

## 2022-05-07 RX ORDER — SODIUM CHLORIDE 9 MG/ML
1000 INJECTION, SOLUTION INTRAVENOUS
Refills: 0 | Status: COMPLETED | OUTPATIENT
Start: 2022-05-07 | End: 2022-05-08

## 2022-05-07 RX ORDER — FOLIC ACID 0.8 MG
1 TABLET ORAL DAILY
Refills: 0 | Status: DISCONTINUED | OUTPATIENT
Start: 2022-05-07 | End: 2022-05-08

## 2022-05-07 RX ORDER — ACETAMINOPHEN 500 MG
1000 TABLET ORAL ONCE
Refills: 0 | Status: COMPLETED | OUTPATIENT
Start: 2022-05-07 | End: 2022-05-07

## 2022-05-07 RX ORDER — DEXAMETHASONE 0.5 MG/5ML
10 ELIXIR ORAL ONCE
Refills: 0 | Status: DISCONTINUED | OUTPATIENT
Start: 2022-05-07 | End: 2022-05-07

## 2022-05-07 RX ORDER — LIDOCAINE HCL 20 MG/ML
10 VIAL (ML) INJECTION ONCE
Refills: 0 | Status: COMPLETED | OUTPATIENT
Start: 2022-05-07 | End: 2022-05-07

## 2022-05-07 RX ORDER — AMOXICILLIN 250 MG/5ML
1 SUSPENSION, RECONSTITUTED, ORAL (ML) ORAL
Qty: 20 | Refills: 0
Start: 2022-05-07 | End: 2022-05-16

## 2022-05-07 RX ORDER — DEXAMETHASONE 0.5 MG/5ML
10 ELIXIR ORAL ONCE
Refills: 0 | Status: COMPLETED | OUTPATIENT
Start: 2022-05-07 | End: 2022-05-07

## 2022-05-07 RX ORDER — ACETAMINOPHEN 500 MG
1000 TABLET ORAL ONCE
Refills: 0 | Status: DISCONTINUED | OUTPATIENT
Start: 2022-05-07 | End: 2022-05-08

## 2022-05-07 RX ORDER — SODIUM CHLORIDE 9 MG/ML
1000 INJECTION, SOLUTION INTRAVENOUS ONCE
Refills: 0 | Status: COMPLETED | OUTPATIENT
Start: 2022-05-07 | End: 2022-05-07

## 2022-05-07 RX ORDER — LIDOCAINE 4 G/100G
15 CREAM TOPICAL ONCE
Refills: 0 | Status: COMPLETED | OUTPATIENT
Start: 2022-05-07 | End: 2022-05-07

## 2022-05-07 RX ORDER — AMPICILLIN SODIUM AND SULBACTAM SODIUM 250; 125 MG/ML; MG/ML
3 INJECTION, POWDER, FOR SUSPENSION INTRAMUSCULAR; INTRAVENOUS EVERY 6 HOURS
Refills: 0 | Status: DISCONTINUED | OUTPATIENT
Start: 2022-05-07 | End: 2022-05-08

## 2022-05-07 RX ORDER — HEPARIN SODIUM 5000 [USP'U]/ML
5000 INJECTION INTRAVENOUS; SUBCUTANEOUS EVERY 12 HOURS
Refills: 0 | Status: DISCONTINUED | OUTPATIENT
Start: 2022-05-07 | End: 2022-05-08

## 2022-05-07 RX ADMIN — AMPICILLIN SODIUM AND SULBACTAM SODIUM 200 GRAM(S): 250; 125 INJECTION, POWDER, FOR SUSPENSION INTRAMUSCULAR; INTRAVENOUS at 17:50

## 2022-05-07 RX ADMIN — Medication 400 MILLIGRAM(S): at 19:02

## 2022-05-07 RX ADMIN — AMPICILLIN SODIUM AND SULBACTAM SODIUM 200 GRAM(S): 250; 125 INJECTION, POWDER, FOR SUSPENSION INTRAMUSCULAR; INTRAVENOUS at 23:14

## 2022-05-07 RX ADMIN — SODIUM CHLORIDE 1000 MILLILITER(S): 9 INJECTION, SOLUTION INTRAVENOUS at 15:38

## 2022-05-07 RX ADMIN — Medication 102 MILLIGRAM(S): at 16:22

## 2022-05-07 NOTE — H&P ADULT - NSHPLABSRESULTS_GEN_ALL_CORE
LABS:             11.5   13.14 )-----------( 363      ( 07 May 2022 16:01 )             35.8     05-07  137  |  100  |  6<L>  ----------------------------<  94  4.3   |  23  |  0.50    Ca    9.4      07 May 2022 16:01    TPro  8.3  /  Alb  3.7  /  TBili  0.4  /  DBili  x   /  AST  29  /  ALT  30  /  AlkPhos  101  05-07    < from: CT Neck Soft Tissue No Cont (05.07.22 @ 18:57) >    FINDINGS:    Examination limited due to artifact from dental hardware and without IV   contrast.    NASAL CAVITIES: Within normal limits.  PHARYNX: Increased soft tissue density/swelling in the region of the left   tonsil. Cannot exclude tonsillar abscess/collection.  LARYNX: Within normal limits.  ESOPHAGUS: Within normal limits.    PAROTID AND SUBMANDIBULAR GLANDS: Within normal limits.  THYROID: Within normal limits.    LYMPH NODES: Bilateral lymphadenopathy  BONES: Mild subluxation of the right TMJ.    VISUALIZED PORTIONS:  INTRACRANIAL STRUCTURES: Within normal limits.  PARANASAL SINUSES: Within normal limits.  LUNGS: Within normal limits.    IMPRESSION:  *  Increased softtissue density/swelling of the left tonsil. Cannot   exclude tonsillar abscess/collection without IV contrast.  *  Bilateral lymphadenopathy.    --- End of Report ---    < end of copied text >

## 2022-05-07 NOTE — OB PROVIDER H&P - ASSESSMENT
"PT ACUTE  Treatment Session          Pt seen on 10S nursing unit. Frequency Comments: ZACHERY HOWELL (fabrice, assist of 2)    RECOMMENDATIONS FOR DISCHARGE:  Recommendation for Discharge: PT: Sub-acute nursing home (11/08/17 1420)                                                                                                                 Admitting complaint: ST elevation myocardial infarction (STEMI), unspecified artery (CMS/HCC) [I21.3]                                     Precautions  Other Precautions: Prefers to be called ""Hua\"" (11/06/17 1455)    SUBJECTIVE: Patient's Personal Goal: none offered (11/08/17 1420)  Subjective: Pt agreeable to therapy nodding head \""yes\"". No c/o pain. (11/08/17 1420)  Subjective/Objective Comments: RN aware of session. Pt was lying supine in bed at end of session. Call light within reach. (11/08/17 1420)    OBJECTIVE:  Basic Lines: NG;Telemetry (11/08/17 1420)  Safety Measures: Alarms (11/08/17 1420)    RN reported [facility] 75    ASSESSMENT:      Pt continues to be below baseline level for functional mobility due to increased weakness/ fatigue/ decreased balance. Pt requried max assist for supine <-> sit transfer, cues for sequence/ assist with upper trunk and BLEs. Pt dangled EOB x 10 minutes overall and varied from mod assist to max assist with slight right sided lean, focused on sitting weight shifting and neck extension exercises while sitting. Pt will benefit from skilled therapy to progress with transfers/ sitting tolerance/ ambulation as tolerated. EDUCATION:   On this date, the patient was educated on safe transfers/ sitting tolerance/ therex.     The response to education was: Needs reinforcement    PT Identified Barriers to Discharge: weakness, balance and safety deficits, decreased activity tolerance, lethargy/confusion, requires assist x2     PLAN:   Continue skilled PT, including the following " 23y/o  @27w4d (by SAMANTHA 22) c/b Hallie recently diagnosed with GA Strep throat presenting to the ED complaining of worsening sore throat and difficulty swallowing. Patient in stable condition, mild tachycardia in ED with mild range BPs, afebrile, overall well appearing. Labs with leukocytosis, WBC 13, otherwise unremarkable. CT Neck performed, demonstrating soft tissue density/swelling of the left tonsil and bilateral lymphadenopathy. ENT saw and evaluated patient, recommended bedside needle aspiration, however patient declined, favoring conservative management with IV antibiotics. - No obstetric complaints at this time; no vaginal bleeding, loss of fluid, or contractions. NST reactive.  Reports good fetal movement.   - Pt to be admitted to OBGYN service for IV Unasyn 3g q6  - Pt unable to tolerate PO labetalol at this time, will defer initiation of IV antihypertensives at this time, will continue to monitor BP's closely (130/60-70's in the ED)  - HSQ for DVT ppx  - ADAT  - Appreciating ENT recommendations    D/w Dr. Kika Spain, PGY-2 Treatment/Interventions: Functional transfer training;Strengthening; Endurance training;Bed mobility;Gait training (11/08/17 1420)   Frequency Comments: M W F (aide, assist of 2) (11/08/17 1420)    Treatment Plan for Next Session: LE strengthening, bed mobility, sitting balance/tolerance, progress transfers once appropriate  Additional Plan Considerations: ok for PTA to see until appropriate for transfer trials       RECOMMENDATIONS FOR DISCHARGE:  Recommendation for Discharge: PT: Sub-acute nursing home (11/08/17 1420)    PT/OT Mobility Equipment for Discharge: continue to assess (11/08/17 1420)  PT/OT ADL Equipment for Discharge: continue to assess (11/06/17 1455)    ICU Mobility Assesment (PERME):       Last 24 hours of Functional Data  Bed Mobility   Bed Mobility  Boosting: Total Assist - Dependent (11/08/17 1420)  Supine to Sit: Maximal Assist (Max) (11/08/17 1420)  Sit to Supine: Maximal Assist (Max) (11/08/17 1420)  Bed Mobility Comments: cues for sequence/ assist with upper trunk and BLEs with HOB elevated. (11/08/17 1420)    Transfers  Transfers  Transfer Comments 1: Deferred due to poor sitting tolerance and slight lethargy. (11/08/17 1420)      Gait   ,      Stairs          Neuromuscular Re-education       Balance  Balance  Sitting - Static: Moderate Assist (Mod) (11/08/17 1420)  Sitting - Dynamic: Maximal Assist (Max) (11/08/17 1420)  Balance Comments #1: sitting: Pt dangled EOB x 10 minutes overall with focus on neck extension exercises and ROM to LUE in sitting. Overall varied from mod assist to max assist. (11/08/17 1420)    Wheelchair Mobility       Patient's Personal Goal: none offered (11/08/17 1420)    Therapy Goals:    Goals  Short Term Goals to Be Reviewed On: 11/13/17 (11/08/17 1420)  Short Term Goals = Discharge Goals: No (11/04/17 1455)  Goal Agreement: Patient agrees with goals and treatment plan (11/04/17 1455)  Bed Mobility Short Term Goal: Pt to be mod assist with bed mobility. (11/06/17 1455)  Bed Mobility Discharge Goal: Pt to be modified independent with bed mobility, bed flat and no side rail. (11/06/17 1455)  Bed Mobility Discharge Goal Progress: Outcome not met, continue to monitor (11/06/17 1455)  Transfer Short Term Goal: set once assessed (11/06/17 1455)  Ambulation Short Term Goal: set once assessed (11/06/17 1455)  Stairs Short Term Goal: set once assessed (11/04/17 1455)  Other Short Term Goal 1: Pt to be min assist with sitting balance at edge of bed x10min. (11/04/17 1455)  Other Discharge Goal 1: Pt to be independent with sitting balance edge of bed x10min. (11/04/17 1455)  Goals Comments: goals updated 11/6/17 (11/06/17 1455)        PT Time Spent: 38 minutes (11/08/17 1420)    See PT flowsheet for full details regarding the PT therapy provided.

## 2022-05-07 NOTE — ED POST DISCHARGE NOTE - REASON FOR FOLLOW-UP
Pt with +Group A strep on throat culture. Was not sent home on Abx. Spoke to patient states +throat pain and difficulty eating/drinking/swallowing. Pt currently pregnant. Advised that she should return to ED for further evaluation. Voice sounded slightly muffled over phone. Told her I would sent ABX to pharmacy but still advised she return to ED for evaluation given difficulty tolerating PO. Pt understood. States she will return. Other

## 2022-05-07 NOTE — ED ADULT TRIAGE NOTE - CHIEF COMPLAINT QUOTE
Pt , 27weeks pregnant recently discharged on Wednesday morning, tested positive for strep throat, pt advised to return to ED for worsening throat pain, reports unable to take home medication for HTN. PMHx: HTN

## 2022-05-07 NOTE — CONSULT NOTE ADULT - SUBJECTIVE AND OBJECTIVE BOX
HPI:  22 year old female 27 weeks gestation, with gestational HTN, here for evaluation of throat pain that has been ongoing for 3-4 days. Pt reports she was seen in ED 2 days ago and called back today due to strep throat swab culture coming back positive. Pt has not yet started abx, but reports she is unable to swallow her meds due to pain in her throat. She has tried throat lozenges and viscous lidocaine without relief. Says she has to spit her saliva out and has pain at L anterior neck. Hasn't taken her Labetalol for the past few days. No fever, chills, cp, sob, cough, vomiting, diarrhea. No issues with pregnancy thus far.   OB- Dr. Daniels    ENT consulted for tonsillitis. has not taken any abx so far. trismus. no neck rom restriction. no sob resp distresss. has odynophagia.       Physical Exam  T(C): 36.7 (05-07-22 @ 19:07), Max: 36.7 (05-07-22 @ 19:07)  HR: 86 (05-07-22 @ 19:07) (86 - 106)  BP: 136/66 (05-07-22 @ 19:07) (136/66 - 138/89)  RR: 18 (05-07-22 @ 19:07) (16 - 18)  SpO2: 100% (05-07-22 @ 19:07) (99% - 100%)    General: NAD, A+Ox3  No respiratory distress, stridor, or stertor  Voice quality: normal  OC: tongue mobile, fom soft. soft palate wnl, no bulge. L tonsil erythematous and bulging, no exudates seen. R tonsil large minimal erythema. b/l tonsil sizes 3+  neck soft and flat, no neck rom restriction  some trismus, AUGUSTO 2 fingers    CT done without contrast. possible intratonsillar collection vs phlegmon    A/P: 23yo F , 27wk gestation, L tonsillitis, strep positive, intratonsillar abscess vs phlegmon  - counseled patient on management. recommended bedside needle aspiration because of limiting odynophagia and trismus. patient declined needle aspiration at this time, favoring conservative management for iv abx. risks counseled to patient including potential worsening of condition. no further questions or concerns expressed  - iv abx, admission to medicine or OBGYN  - empiric choice can be unasyn if cleared by primary team for fetal effects  - we will follow along  - symptom control with lozenges prn    --------------------------------------------------------------  Thank you for the consult,    Jeancarlos Roland MD  Resident  Department of Otolaryngology - Head and Neck Surgery  Spectra #[phone]  Peds Page #[phone]  Adult Page #[phone]  ---------------------------------------------------------------

## 2022-05-07 NOTE — ED PROVIDER NOTE - CLINICAL SUMMARY MEDICAL DECISION MAKING FREE TEXT BOX
Bartolo Kent, PGY-2- 22 year old female 27 weeks pregnant with 3-4 days throat/neck pain, difficulty opening mouth, L tonsils enlarged with exudate. Vitals significant for hr 106, suspect pregnancy related, on labetalol and hasn't taken today. Otherwise nontoxic appearing. Plan to discuss with ob, obtain labs, likely ct neck with iv contrast, decadron, unasyn. will need admission 2/2 unable to tolerate PO at home and for airway monitoring

## 2022-05-07 NOTE — ED ADULT NURSE NOTE - OBJECTIVE STATEMENT
Pt received to rm 12, awake and alert, A&OX4, ambulatory. C/o throat pain. States she was seen here recently had gotten call today for positive strep culture. Pt is 27 weeks pregnant. Respirations even and unlabored. Resting comfortably. Denies CP, SOB, N/V, HA, dizziness, palpitations, fatigue. 20G IV placed to  RAC. Refusing second IV for medication administration due to incompatibility. Bed in lowest position, call bell within reach. Will continue to monitor. Pt received to rm 12, awake and alert, A&OX4, ambulatory. C/o throat pain. States she was seen here recently had gotten call today for positive strep culture. Pt is 27 weeks pregnant. Respirations even and unlabored. Airway patent, no secretions. Resting comfortably. Denies CP, SOB, N/V, HA, dizziness, palpitations, fatigue. 20G IV placed to  RAC. Refusing second IV for medication administration due to incompatibility. Bed in lowest position, call bell within reach. Will continue to monitor.

## 2022-05-07 NOTE — ED PROVIDER NOTE - PROGRESS NOTE DETAILS
Bartolo Kent, PGY-2- spoke with OB- agree with plan for CT neck with iv contrast and decadron. Awaiting abx Bartolo Kent, PGY-2- Spoke to radiology, said no need for iv contrast, will get non con. ENT called and will see pt

## 2022-05-07 NOTE — CONSULT NOTE ADULT - SUBJECTIVE AND OBJECTIVE BOX
Gyn Consult Note  JESSICA CLAYTON  22y  Female [id number]    HPI: 21y/o  @26. (by SAMANTHA) presenting to the ED complaining of sore throat adn difficulty swallowing.  Patient reports ___. She denies ___.    Name of GYN Physician: Dr. Mohamud    OBHx:    - C/S, FT, c/b sPEC/Mg  GYNHx: Denies fibroids, cysts, endometriosis, STI's, Abnormal pap smears   PMHx:  PSHx:  Meds: Labetalol 200 tid, PNVs  All: NKDA    Vital Signs Last 24 Hrs  T(C): 36.4 (07 May 2022 13:48), Max: 36.4 (07 May 2022 13:48)  T(F): 97.5 (07 May 2022 13:48), Max: 97.5 (07 May 2022 13:48)  HR: 90 (07 May 2022 15:45) (90 - 106)  BP: 136/72 (07 May 2022 15:45) (136/72 - 138/89)  BP(mean): --  RR: 18 (07 May 2022 15:45) (16 - 18)  SpO2: 100% (07 May 2022 15:45) (99% - 100%)    Physical Exam:   General: sitting comfortably in bed, NAD   CV: RRR  Lungs: CTAB  Abd: Soft, gravid, non-tender, non-distended.  Bowel sounds present.    Ext: non-tender b/l, no edema     LABS:             11.5   13.14 )-----------( 363      ( 07 May 2022 16:01 )             35.8       137  |  100  |  6<L>  ----------------------------<  94  4.3   |  23  |  0.50    Ca    9.4      07 May 2022 16:01    TPro  8.3  /  Alb  3.7  /  TBili  0.4  /  DBili  x   /  AST  29  /  ALT  30  /  AlkPhos  101  05- Gyn Consult Note  JESSICA CLAYTON  22y  Female [id number]    HPI: 23y/o  @27w4d (by SAMANTHA 22) c/b Hallie recently diagnosed with GA Strep throat presenting to the ED complaining of worsening sore throat and difficulty swallowing.  Patient reports throat/neck pain for the past 3-4 days with associated burning with swallowing. She was seen on  in the ED for similar complaints, and was diagnosed with Strep Throat and discharged with Amoxicillin for tx. She reports worsening symptoms, has been unable to swallow medications, with a mild cough. She denies lightheadedness, dizziness, HA, CP, palpitations, N/V, urinary symptoms, and changes in bowel habits. No obstetric complaints; no vaginal bleeding, loss of fluid, or contractions. Reports good fetal movement.     Name of GYN Physician: Dr. Mohamud    OBHx:    - C/S, FT, c/b sPEC/Mg (2018)  GYNHx: Denies fibroids, cysts, endometriosis, STI's, Abnormal pap smears   PMHx: Denies  PSHx: C/S x1  Meds: Labetalol 200 tid, PNVs  All: NKDA    Vital Signs Last 24 Hrs  T(C): 36.4 (07 May 2022 13:48), Max: 36.4 (07 May 2022 13:48)  T(F): 97.5 (07 May 2022 13:48), Max: 97.5 (07 May 2022 13:48)  HR: 90 (07 May 2022 15:45) (90 - 106)  BP: 136/72 (07 May 2022 15:45) (136/72 - 138/89)  BP(mean): --  RR: 18 (07 May 2022 15:45) (16 - 18)  SpO2: 100% (07 May 2022 15:45) (99% - 100%)    Physical Exam:   General: sitting comfortably in bed, NAD   CV: RRR  Lungs: CTAB  Abd: Soft, gravid, non-tender, non-distended.  Bowel sounds present.    Ext: non-tender b/l, no edema     LABS:             11.5   13.14 )-----------( 363      ( 07 May 2022 16:01 )             35.8     05-07  137  |  100  |  6<L>  ----------------------------<  94  4.3   |  23  |  0.50    Ca    9.4      07 May 2022 16:01    TPro  8.3  /  Alb  3.7  /  TBili  0.4  /  DBili  x   /  AST  29  /  ALT  30  /  AlkPhos  101  05-07

## 2022-05-07 NOTE — OB RN PATIENT PROFILE - FUNCTIONAL ASSESSMENT - DAILY ACTIVITY PT AGE POP HIDDEN
Mom at bedside given discharge instructions, verbalized understanding. Ambulated out of ED with steady gait.
Mom at bedside. Patient acting age appropriate. Per mom patient has autism but hasn't been complaining of pain.  Patient has been jumping around at home and acting \"normal\"
Adult

## 2022-05-07 NOTE — OB PROVIDER H&P - NSHPPHYSICALEXAM_GEN_ALL_CORE
Vital Signs Last 24 Hrs  T(C): 36.7 (07 May 2022 19:07), Max: 36.7 (07 May 2022 19:07)  T(F): 98 (07 May 2022 19:07), Max: 98 (07 May 2022 19:07)  HR: 86 (07 May 2022 19:07) (86 - 106)  BP: 136/66 (07 May 2022 19:07) (136/66 - 138/89)  BP(mean): --  RR: 18 (07 May 2022 19:07) (16 - 18)  SpO2: 100% (07 May 2022 19:07) (99% - 100%)    Physical Exam: Vital Signs Last 24 Hrs  T(C): 36.4 (07 May 2022 13:48), Max: 36.4 (07 May 2022 13:48)  T(F): 97.5 (07 May 2022 13:48), Max: 97.5 (07 May 2022 13:48)  HR: 90 (07 May 2022 15:45) (90 - 106)  BP: 136/72 (07 May 2022 15:45) (136/72 - 138/89)  BP(mean): --  RR: 18 (07 May 2022 15:45) (16 - 18)  SpO2: 100% (07 May 2022 15:45) (99% - 100%)    Physical Exam:   General: sitting comfortably in bed, NAD   CV: RRR  Lungs: CTAB  Abd: Soft, gravid, non-tender, non-distended.  Bowel sounds present.    Ext: non-tender b/l, no edema

## 2022-05-07 NOTE — ED PROVIDER NOTE - ATTENDING CONTRIBUTION TO CARE
Attending note:   After face to face evaluation of this patient, I concur with above noted hx, pe, and care plan for this patient.  Coker: 22 yof with 27 week gestation with sore throat. PMHx of HTN. Pt. with sore throat for 4 days. Pt seen in ED few days ago and told of negative mono and +strep. Pt has not yet picked up Rx for antibxs yet. Pt complaining of inability to swallow, spitting up saliva, unable to open mouth, chills. Pt now with worsening pain in left anterior neck. Pt has also been unable to swallow her BP meds. Pt is non toxic but uncomfortable. +trismus with left tonsillar edema and erythema and exudate. no uvula deviation. +tenderness without palpable LN in left anterior neck, FROM of neck without meningismus. clear lungs, no stridor. Pt with tonsillitis but concerning picture for abscess - discussed with OB and agree with steroids, antibxs, CT with IV contrast to evaluate for abscess, possible ENT. NST requested.

## 2022-05-07 NOTE — CONSULT NOTE ADULT - ASSESSMENT
- No acute OB intervention  - NST to be performed  - No obstetric complaints    D/w Dr. Yoli Lopez PGY2 21y/o  @27w4d (by SAMANTHA 20) c/b gHTN recently diagnosed with GA Strep throat presenting to the ED complaining of worsening sore throat and difficulty swallowing. Patient in stable condition, mild tachycardia in ED with mild range BPs, afebrile, overall well appearing.   - No acute OB intervention  - No obstetric complaints at this time; no vaginal bleeding, loss of fluid, or contractions. Reports good fetal movement.   - NST to be performed  - Agree with IV antibiotics  - Cont to monitor BPs for hx of gHTN, call antepartum service for BPs > 160/110  - OB team to follow    D/w Dr. Yoli Lopez PGY2

## 2022-05-07 NOTE — OB PROVIDER H&P - HISTORY OF PRESENT ILLNESS
JESSICA CLAYTON  22y  Female [id number]    HPI: 21y/o  @27w4d (by SAMANTHA 22) c/b CelineTCAMILO recently diagnosed with GA Strep throat presenting to the ED complaining of worsening sore throat and difficulty swallowing.  Patient reports throat/neck pain for the past 3-4 days with associated burning with swallowing. She was seen on  in the ED for similar complaints, and was diagnosed with Strep Throat and discharged with Amoxicillin for tx. She reports worsening symptoms, has been unable to swallow medications, with a mild cough. She denies lightheadedness, dizziness, HA, CP, palpitations, N/V, urinary symptoms, and changes in bowel habits. No obstetric complaints; no vaginal bleeding, loss of fluid, or contractions. Reports good fetal movement.     Name of GYN Physician: Dr. Mohamud    OBHx:    - C/S, FT, c/b sPEC/Mg (2018)  GYNHx: Denies fibroids, cysts, endometriosis, STI's, Abnormal pap smears   PMHx: Denies  PSHx: C/S x1  Meds: Labetalol 200 tid, PNVs  All: NKDA

## 2022-05-07 NOTE — OB RN PATIENT PROFILE - FALL HARM RISK - UNIVERSAL INTERVENTIONS
Bed in lowest position, wheels locked, appropriate side rails in place/Call bell, personal items and telephone in reach/Instruct patient to call for assistance before getting out of bed or chair/Non-slip footwear when patient is out of bed/[place] to call system/Physically safe environment - no spills, clutter or unnecessary equipment/Purposeful Proactive Rounding/Room/bathroom lighting operational, light cord in reach

## 2022-05-07 NOTE — ED PROVIDER NOTE - PHYSICAL EXAMINATION
General: well appearing, no acute distress, AOx3  Skin: no rash, no pallor  Head: normocephalic, atraumatic  Eyes: clear conjunctiva, EOMI  ENMT: airway patent, no nasal discharge, L tonsil enlarged with exudate tender at L mandibular medial jaw line, no fluctuance, area of firmness at L submental region, pain with jaw opening   Cardiovascular: normal rate, normal rhythm, S1/S2  Pulmonary: clear to auscultation bilaterally, no rales, rhonchi, or wheeze  Abdomen: soft, nontender, gravid   Musculoskeletal: moving extremities well, no deformity  Psych: normal mood, normal affect

## 2022-05-07 NOTE — H&P ADULT - NSHPPHYSICALEXAM_GEN_ALL_CORE
Vital Signs Last 24 Hrs  T(C): 36.4 (07 May 2022 13:48), Max: 36.4 (07 May 2022 13:48)  T(F): 97.5 (07 May 2022 13:48), Max: 97.5 (07 May 2022 13:48)  HR: 90 (07 May 2022 15:45) (90 - 106)  BP: 136/72 (07 May 2022 15:45) (136/72 - 138/89)  BP(mean): --  RR: 18 (07 May 2022 15:45) (16 - 18)  SpO2: 100% (07 May 2022 15:45) (99% - 100%)    Physical Exam:   General: sitting comfortably in bed, NAD   CV: RRR  Lungs: CTAB  Abd: Soft, gravid, non-tender, non-distended.  Bowel sounds present.    Ext: non-tender b/l, no edema

## 2022-05-07 NOTE — H&P ADULT - HISTORY OF PRESENT ILLNESS
JESSICA CLAYTON  22y  Female [id number]    HPI: 23y/o  @27w4d (by SAMANTHA 22) c/b CelineTCAMILO recently diagnosed with GA Strep throat presenting to the ED complaining of worsening sore throat and difficulty swallowing.  Patient reports throat/neck pain for the past 3-4 days with associated burning with swallowing. She was seen on  in the ED for similar complaints, and was diagnosed with Strep Throat and discharged with Amoxicillin for tx. She reports worsening symptoms, has been unable to swallow medications, with a mild cough. She denies lightheadedness, dizziness, HA, CP, palpitations, N/V, urinary symptoms, and changes in bowel habits. No obstetric complaints; no vaginal bleeding, loss of fluid, or contractions. Reports good fetal movement.     Name of GYN Physician: Dr. Mohamud    OBHx:    - C/S, FT, c/b sPEC/Mg (2018)  GYNHx: Denies fibroids, cysts, endometriosis, STI's, Abnormal pap smears   PMHx: Denies  PSHx: C/S x1  Meds: Labetalol 200 tid, PNVs  All: NKDA

## 2022-05-07 NOTE — ED PROVIDER NOTE - OBJECTIVE STATEMENT
Bartolo Kent, PGY-2- 22 year old female 27 weeks gestation, with gestational HTN, here for evaluation of throat pain that has been ongoing for 3-4 days. Pt reports she was seen in ED 2 days ago and called back today due to strep throat swab culture coming back positive. Pt has not yet started abx, but reports she is unable to swallow her meds due to pain in her throat. She has tried throat lozenges and viscous lidocaine without relief. Says she has to spit her saliva out and has pain at L anterior neck. Hasn't taken her Labetalol for the past few days. No fever, chills, cp, sob, cough, vomiting, diarrhea. No issues with pregnancy thus far.   OB- Dr. Daniels

## 2022-05-07 NOTE — H&P ADULT - ASSESSMENT
23y/o  @27w4d (by SAMANTHA 22) c/b Hallie recently diagnosed with GA Strep throat presenting to the ED complaining of worsening sore throat and difficulty swallowing. Patient in stable condition, mild tachycardia in ED with mild range BPs, afebrile, overall well appearing. Labs with leukocytosis, WBC 13, otherwise unremarkable. CT Neck performed, demonstrating soft tissue density/swelling of the left tonsil and bilateral lymphadenopathy. ENT saw and evaluated patient, recommended bedside needle aspiration, however patient declined, favoring conservative management with IV antibiotics. - No obstetric complaints at this time; no vaginal bleeding, loss of fluid, or contractions. NST reactive.  Reports good fetal movement.   - Pt to be admitted to OBGYN service for IV Unasyn  - Pt unable to tolerate PO labetalol at this time, will defer initiation of IV antihypertensives at this time, will continue to monitor BP's closely (130/60-70's in the ED)  - HSQ for DVT ppx  - ADAT  - Appreciating ENT recomendations    D/w Dr. Yoli Spain, PGY-2

## 2022-05-08 VITALS
HEART RATE: 97 BPM | SYSTOLIC BLOOD PRESSURE: 115 MMHG | DIASTOLIC BLOOD PRESSURE: 69 MMHG | RESPIRATION RATE: 18 BRPM | OXYGEN SATURATION: 100 % | TEMPERATURE: 98 F

## 2022-05-08 RX ORDER — FAMOTIDINE 10 MG/ML
20 INJECTION INTRAVENOUS ONCE
Refills: 0 | Status: COMPLETED | OUTPATIENT
Start: 2022-05-08 | End: 2022-05-08

## 2022-05-08 RX ORDER — BENZOCAINE AND MENTHOL 5; 1 G/100ML; G/100ML
1 LIQUID ORAL THREE TIMES A DAY
Refills: 0 | Status: DISCONTINUED | OUTPATIENT
Start: 2022-05-08 | End: 2022-05-08

## 2022-05-08 RX ORDER — FOLIC ACID 0.8 MG
1 TABLET ORAL
Qty: 0 | Refills: 0 | DISCHARGE
Start: 2022-05-08

## 2022-05-08 RX ADMIN — AMPICILLIN SODIUM AND SULBACTAM SODIUM 200 GRAM(S): 250; 125 INJECTION, POWDER, FOR SUSPENSION INTRAMUSCULAR; INTRAVENOUS at 17:27

## 2022-05-08 RX ADMIN — AMPICILLIN SODIUM AND SULBACTAM SODIUM 200 GRAM(S): 250; 125 INJECTION, POWDER, FOR SUSPENSION INTRAMUSCULAR; INTRAVENOUS at 11:29

## 2022-05-08 RX ADMIN — HEPARIN SODIUM 5000 UNIT(S): 5000 INJECTION INTRAVENOUS; SUBCUTANEOUS at 05:50

## 2022-05-08 RX ADMIN — Medication 1 TABLET(S): at 11:29

## 2022-05-08 RX ADMIN — SODIUM CHLORIDE 125 MILLILITER(S): 9 INJECTION, SOLUTION INTRAVENOUS at 11:28

## 2022-05-08 RX ADMIN — AMPICILLIN SODIUM AND SULBACTAM SODIUM 200 GRAM(S): 250; 125 INJECTION, POWDER, FOR SUSPENSION INTRAMUSCULAR; INTRAVENOUS at 05:50

## 2022-05-08 RX ADMIN — FAMOTIDINE 20 MILLIGRAM(S): 10 INJECTION INTRAVENOUS at 14:47

## 2022-05-08 RX ADMIN — Medication 1 MILLIGRAM(S): at 11:29

## 2022-05-08 NOTE — DISCHARGE NOTE ANTEPARTUM - CARE PLAN
1 Principal Discharge DX:	Tonsillar abscess  Assessment and plan of treatment:	Please continue antibiotics x 10 day. You may follow up with your PCP for your strep throat/abscess. If further concerns for swelling or shortness of breath please seek immediate ED attention.

## 2022-05-08 NOTE — DISCHARGE NOTE ANTEPARTUM - PLAN OF CARE
Please continue antibiotics x 10 day. You may follow up with your PCP for your strep throat/abscess. If further concerns for swelling or shortness of breath please seek immediate ED attention.

## 2022-05-08 NOTE — DISCHARGE NOTE ANTEPARTUM - MEDICATION SUMMARY - MEDICATIONS TO STOP TAKING
I will STOP taking the medications listed below when I get home from the hospital:    amoxicillin 500 mg oral tablet  -- 1 tab(s) by mouth 2 times a day   -- Finish all this medication unless otherwise directed by prescriber.    labetalol 200 mg oral tablet  -- 1 tab(s) by mouth every 8 hours

## 2022-05-08 NOTE — PROGRESS NOTE ADULT - ASSESSMENT
23y/o  @27w5d c/b gHTN recently diagnosed with GA Strep throat presenting to the ED complaining of worsening sore throat and difficulty swallowing. Patient in stable condition, mild tachycardia in ED with mild range BPs, afebrile, overall well appearing. Labs with leukocytosis, WBC 13, otherwise unremarkable. CT Neck performed, demonstrating soft tissue density/swelling of the left tonsil and bilateral lymphadenopathy.    #Strep throat  -c/w Unasyn 3g q6h  -seen by ENT and offered aspiration of odynophagia- declined  -RVP, flu, covid negative    #gHTN  -home labetalol 200 TID held  -HELLP wnl  -monitor BPs  -BPs wnl    #Fetal well being  -NST qD  -NICU consult if indicated    #Maternal well being  -ADAT  -LR@125 due to inability to tolerate PO  -HSQ, SCDs for DVT ppx    MGreenman PGY3

## 2022-05-08 NOTE — DISCHARGE NOTE ANTEPARTUM - MEDICATION SUMMARY - MEDICATIONS TO TAKE
I will START or STAY ON the medications listed below when I get home from the hospital:    Prenatal Multivitamins with Folic Acid 1 mg oral tablet  -- 1 tab(s) by mouth once a day  -- Indication: For Prenatal vitamin    amoxicillin-clavulanate 875 mg-125 mg oral tablet  -- 1 tab(s) by mouth 2 times a day   -- Finish all this medication unless otherwise directed by prescriber.  Take with food or milk.    -- Indication: For Tonsillar abscess    folic acid 1 mg oral tablet  -- 1 tab(s) by mouth once a day  -- Indication: For folic acid

## 2022-05-08 NOTE — PROGRESS NOTE ADULT - SUBJECTIVE AND OBJECTIVE BOX
Patient is a 22y old  Female who presents with a chief complaint of GA strep throat c/b intratonsillar abscess vs. phlegmon (08 May 2022 04:37)      HPI:  JESSICA CLAYTON  22y  Female [id number]    HPI: 23y/o  @27w4d (by SAMANTHA 22) c/b gHTN recently diagnosed with GA Strep throat presenting to the ED complaining of worsening sore throat and difficulty swallowing.  Patient reports throat/neck pain for the past 3-4 days with associated burning with swallowing. She was seen on  in the ED for similar complaints, and was diagnosed with Strep Throat and discharged with Amoxicillin for tx. She reports worsening symptoms, has been unable to swallow medications, with a mild cough. She denies lightheadedness, dizziness, HA, CP, palpitations, N/V, urinary symptoms, and changes in bowel habits. No obstetric complaints; no vaginal bleeding, loss of fluid, or contractions. Reports good fetal movement.     Name of GYN Physician: Dr. Mohamud    OBHx:    - C/S, FT, c/b sPEC/Mg (2018)  GYNHx: Denies fibroids, cysts, endometriosis, STI's, Abnormal pap smears   PMHx: Denies  PSHx: C/S x1  Meds: Labetalol 200 tid, PNVs  All: NKDA (07 May 2022 21:20)      PAST MEDICAL & SURGICAL HISTORY:  No pertinent past medical history    No significant past surgical history        MEDICATIONS  (STANDING):  acetaminophen   IVPB .. 1000 milliGRAM(s) IV Intermittent once  ampicillin/sulbactam  IVPB 3 Gram(s) IV Intermittent every 6 hours  benzocaine 15 mG/menthol 3.6 mG Lozenge 1 Lozenge Oral three times a day  folic acid 1 milliGRAM(s) Oral daily  heparin   Injectable 5000 Unit(s) SubCutaneous every 12 hours  lactated ringers 1000 milliLiter(s) (125 mL/Hr) IV Continuous <Continuous>  prenatal multivitamin 1 Tablet(s) Oral daily    vittal Signs Last 24 Hrs  T(C): 36.6 (08 May 2022 09:25), Max: 37.1 (07 May 2022 23:24)  T(F): 97.8 (08 May 2022 09:25), Max: 98.8 (07 May 2022 23:24)  HR: 93 (08 May 2022 09:25) (86 - 106)  BP: 106/77 (08 May 2022 09:25) (106/77 - 138/89)  BP(mean): --  RR: 18 (08 May 2022 09:25) (16 - 18)  SpO2: 100% (08 May 2022 09:25) (95% - 100%)    PHYSICAL EXAM:  PAin much improved in throat  Still does not want aspiration  FHR tracing cat 1  No obstetrical issues    If able to switch  to oral medication will start on amoxicillin and discharge her home        I&O's Summary      LABORATORY:                        11.5   13.14 )-----------( 363      ( 07 May 2022 16:01 )             35.8     05-07    137  |  100  |  6<L>  ----------------------------<  94  4.3   |  23  |  0.50    Ca    9.4      07 May 2022 16:01    TPro  8.3  /  Alb  3.7  /  TBili  0.4  /  DBili  x   /  AST  29  /  ALT  30  /  AlkPhos  101  05-07

## 2022-05-08 NOTE — DISCHARGE NOTE ANTEPARTUM - PATIENT PORTAL LINK FT
You can access the FollowMyHealth Patient Portal offered by [facility] by registering at the following website: [email or web address]. By joining [facility]’s FollowMyHealth portal, you will also be able to view your health information using other applications (apps) compatible with our system.

## 2022-05-08 NOTE — PROGRESS NOTE ADULT - REASON FOR ADMISSION
GA strep throat c/b intratonsillar abscess vs. phlegmon

## 2022-05-08 NOTE — PROGRESS NOTE ADULT - SUBJECTIVE AND OBJECTIVE BOX
Patient seen and examined at bedside, no acute overnight events. No acute complaints. Patient endorses good fetal movement. Patient is ambulating. Denies CP, SOB, N/V, fevers, chills, or any other concerns.    Vital Signs Last 24 Hours  T(C): 36.6 (05-08-22 @ 01:16), Max: 37.1 (05-07-22 @ 23:24)  HR: 96 (05-08-22 @ 01:16) (86 - 106)  BP: 119/64 (05-08-22 @ 01:16) (117/53 - 138/89)  RR: 18 (05-08-22 @ 01:16) (16 - 18)  SpO2: 95% (05-08-22 @ 01:16) (95% - 100%)    Physical Exam:  General: NAD  CV: RR  Lungs: breathing comfortably on RA  Abdomen: soft, gravid, non-tender  Ext: no pain or swelling    NST: reactive    Labs:             11.5   13.14<H> )-----------( 363      ( 05-07 @ 16:01 )             35.8       MEDICATIONS  (STANDING):  acetaminophen   IVPB .. 1000 milliGRAM(s) IV Intermittent once  ampicillin/sulbactam  IVPB 3 Gram(s) IV Intermittent every 6 hours  folic acid 1 milliGRAM(s) Oral daily  heparin   Injectable 5000 Unit(s) SubCutaneous every 12 hours  lactated ringers 1000 milliLiter(s) (125 mL/Hr) IV Continuous <Continuous>  prenatal multivitamin 1 Tablet(s) Oral daily    MEDICATIONS  (PRN):  lidocaine 2% Viscous 10 milliLiter(s) Swish and Spit four times a day PRN Mouth Care   Patient seen and examined at bedside, no acute overnight events. No acute complaints. Patient endorses good fetal movement.  Patient was able to eat dinner of chicken and avocado last night.  Patient is ambulating. Denies CP, SOB, N/V, fevers, chills, or any other concerns.    Vital Signs Last 24 Hours  T(C): 36.6 (05-08-22 @ 01:16), Max: 37.1 (05-07-22 @ 23:24)  HR: 96 (05-08-22 @ 01:16) (86 - 106)  BP: 119/64 (05-08-22 @ 01:16) (117/53 - 138/89)  RR: 18 (05-08-22 @ 01:16) (16 - 18)  SpO2: 95% (05-08-22 @ 01:16) (95% - 100%)    Physical Exam:  General: NAD  CV: RR  Lungs: breathing comfortably on RA  Abdomen: soft, gravid, non-tender  Ext: no pain or swelling    NST: reactive    Labs:             11.5   13.14<H> )-----------( 363      ( 05-07 @ 16:01 )             35.8       MEDICATIONS  (STANDING):  acetaminophen   IVPB .. 1000 milliGRAM(s) IV Intermittent once  ampicillin/sulbactam  IVPB 3 Gram(s) IV Intermittent every 6 hours  folic acid 1 milliGRAM(s) Oral daily  heparin   Injectable 5000 Unit(s) SubCutaneous every 12 hours  lactated ringers 1000 milliLiter(s) (125 mL/Hr) IV Continuous <Continuous>  prenatal multivitamin 1 Tablet(s) Oral daily    MEDICATIONS  (PRN):  lidocaine 2% Viscous 10 milliLiter(s) Swish and Spit four times a day PRN Mouth Care

## 2022-05-08 NOTE — DISCHARGE NOTE ANTEPARTUM - NS MD DC FALL RISK RISK
For information on Fall & Injury Prevention, visit: [email or web address] OR  [email or web address] OR  https://www.cdc.gov/steadi/patient.html

## 2022-05-08 NOTE — DISCHARGE NOTE ANTEPARTUM - HOSPITAL COURSE
21y/o  @27w5d c/b Hallie recently diagnosed with GA Strep throat presenting to the ED complaining of worsening sore throat and difficulty swallowing. Patient in stable condition, mild tachycardia in ED with mild range BPs, afebrile, overall well appearing. Labs with leukocytosis, WBC 13, otherwise unremarkable. CT Neck performed, demonstrating soft tissue density/swelling of the left tonsil and bilateral lymphadenopathy. Rapid viral panel, COVID, negative. Chest XR negative.     Seen by ENT recommending aspiration, patient declined and was placed on IV antibiotics x 4 total doses. Will go home on 10d of Augmentin per the ENT team.

## 2022-05-08 NOTE — PROGRESS NOTE ADULT - SUBJECTIVE AND OBJECTIVE BOX
SUBJECTIVE:  Pt seen & examined at bedside  No acute events overnight  Pain controlled  tolerating PO  reports improvement in pain  drainage offered this afternoon, pt declined    Vital Signs Last 24 Hrs  T(C): 36.8 (08 May 2022 13:40), Max: 37.1 (07 May 2022 23:24)  T(F): 98.2 (08 May 2022 13:40), Max: 98.8 (07 May 2022 23:24)  HR: 97 (08 May 2022 13:40) (86 - 100)  BP: 115/69 (08 May 2022 13:40) (106/77 - 136/72)  BP(mean): --  RR: 18 (08 May 2022 13:40) (18 - 18)  SpO2: 100% (08 May 2022 13:40) (95% - 100%)    General: NAD, A+Ox3  No respiratory distress, stridor, or stertor  Voice quality: normal  OC: tongue mobile, fom soft. soft palate wnl, no bulge. L tonsil erythematous and bulging, no exudates seen. Uvular deviation to R. R tonsil large minimal erythema. b/l tonsil sizes 3+  neck soft and flat, no neck rom restriction  some trismus, AUGUSTO 2 fingers    A/P: 21yo F , 27wk gestation, L tonsillitis, strep positive, intratonsillar abscess vs phlegmon  - counseled patient on management. recommended bedside needle aspiration because of limiting odynophagia and trismus. patient declined needle aspiration at this time, favoring conservative management for iv abx. risks counseled to patient including potential worsening of condition. no further questions or concerns expressed  - iv abx, would give PM dose prior to transition to PO  - d/c on augmentin  - symptom control with lozenges prn

## 2022-05-08 NOTE — DISCHARGE NOTE ANTEPARTUM - PROVIDER TOKENS
FREE:[LAST:[Cade],FIRST:[Dion],PHONE:[(   )    -],FAX:[(   )    -],ADDRESS:[[facility]]]

## 2022-06-14 RX ORDER — LABETALOL HCL 100 MG
1 TABLET ORAL
Qty: 0 | Refills: 0 | DISCHARGE

## 2023-07-26 NOTE — OB RN PATIENT PROFILE - NS PRO DEPRESSION SCREENING Y/N1
Doing quite well with his reflux using Nexium. He does note that if he misses 1 or 2 doses of Nexium he is fine, but if he misses more than that, he notices increased problems. Continue the Nexium based on that. no

## 2025-01-27 ENCOUNTER — EMERGENCY (EMERGENCY)
Facility: HOSPITAL | Age: 25
LOS: 1 days | Discharge: HOME | End: 2025-01-27
Admitting: EMERGENCY MEDICINE
Payer: MEDICAID

## 2025-01-27 VITALS
DIASTOLIC BLOOD PRESSURE: 71 MMHG | HEART RATE: 88 BPM | SYSTOLIC BLOOD PRESSURE: 128 MMHG | RESPIRATION RATE: 17 BRPM | OXYGEN SATURATION: 100 % | TEMPERATURE: 98 F | HEIGHT: 64 IN | WEIGHT: 293 LBS

## 2025-01-27 PROCEDURE — 99284 EMERGENCY DEPT VISIT MOD MDM: CPT

## 2025-01-27 NOTE — ED ADULT TRIAGE NOTE - CHIEF COMPLAINT QUOTE
Pt with R knee pain s/p fall up stairs on Saturday. Pt reporting difficulty ambulating, decreased ROM, worsening pain since Saturday. Phx. Pre-eclampsia, 2 c-sections.

## 2025-01-28 PROCEDURE — 73562 X-RAY EXAM OF KNEE 3: CPT | Mod: 26,RT

## 2025-01-28 RX ORDER — IBUPROFEN 200 MG
600 TABLET ORAL ONCE
Refills: 0 | Status: COMPLETED | OUTPATIENT
Start: 2025-01-28 | End: 2025-01-28

## 2025-01-28 RX ADMIN — Medication 600 MILLIGRAM(S): at 01:44

## 2025-01-28 NOTE — ED PROVIDER NOTE - NSFOLLOWUPCLINICS_GEN_ALL_ED_FT
[facility]  Sports Medicine  [address] [place] [address]  Phone: [phone]  Fax:

## 2025-01-28 NOTE — ED PROVIDER NOTE - NSFOLLOWUPINSTRUCTIONS_ED_ALL_ED_FT
Follow up with Sports medicine  Keep brace clean dry and intact.   You can bear weight as tolerated     Return to the ER for worsening or persistent symptoms, including but not limited to worsening/persistent pain, shortness of breath, fevers, vomiting, lightheadedness, passing out and/or ANY NEW OR CONCERNING SYMPTOMS. If you have issues obtaining follow up, please call: [phone] to obtain a doctor or specialist who takes your insurance in your area.  You may call [phone] to make an appointment with the internal medicine clinic.

## 2025-01-28 NOTE — ED PROVIDER NOTE - OBJECTIVE STATEMENT
24-year-old female no reported past medical history presents to the emergency department right knee pain.  Patient states she was walking upstairs on Saturday misstep and landed directly on right knee.  Patient has been having pain since.  Patient states pain is worsening.  Patient denies any other injury or complaint.  No head strike, or LOC

## 2025-01-28 NOTE — ED PROVIDER NOTE - PATIENT PORTAL LINK FT
You can access the FollowMyHealth Patient Portal offered by [facility] by registering at the following website: [email or web address]. By joining [facility]’s FollowMyHealth portal, you will also be able to view your health information using other applications (apps) compatible with our system.

## 2025-01-28 NOTE — ED PROVIDER NOTE - CLINICAL SUMMARY MEDICAL DECISION MAKING FREE TEXT BOX
24-year-old female no reported past medical history presents to the emergency department right knee pain.  Patient states she was walking upstairs on Saturday misstep and landed directly on right knee.  Patient has been having pain since.  Patient states pain is worsening.  Patient denies any other injury or complaint.  No head strike, or LOC    plan  - xr knee  - knee immobilizer  - pain control

## 2025-01-28 NOTE — ED ADULT NURSE NOTE - OBJECTIVE STATEMENT
FLOAT INTAKE RN. Patient A&Ox4 ambulatory c/o right knee pain. No past medical history. Patient states she was walking upstairs on Saturday misstep and landed directly on right knee. Patient has been having pain since. On assessment patient well appearing, ambulatory without difficulty, respirations even and unlabored. Patient denies any other injury or complaint.  No head strike, or LOC. Patient medicated as per orders. Awaiting XR.

## 2025-01-28 NOTE — ED PROVIDER NOTE - PROGRESS NOTE DETAILS
advised knee immobilizer but pt deferred at this time. Referral given to sports medicine and ortho for follow up

## 2025-06-12 ENCOUNTER — EMERGENCY (EMERGENCY)
Facility: HOSPITAL | Age: 25
LOS: 1 days | End: 2025-06-12
Attending: STUDENT IN AN ORGANIZED HEALTH CARE EDUCATION/TRAINING PROGRAM
Payer: MEDICAID

## 2025-06-12 VITALS
HEIGHT: 64 IN | WEIGHT: 293 LBS | HEART RATE: 100 BPM | OXYGEN SATURATION: 97 % | TEMPERATURE: 98 F | DIASTOLIC BLOOD PRESSURE: 79 MMHG | SYSTOLIC BLOOD PRESSURE: 134 MMHG | RESPIRATION RATE: 19 BRPM

## 2025-06-12 PROCEDURE — 99283 EMERGENCY DEPT VISIT LOW MDM: CPT | Mod: 25

## 2025-06-12 PROCEDURE — 73080 X-RAY EXAM OF ELBOW: CPT | Mod: 26,LT

## 2025-06-12 PROCEDURE — 96372 THER/PROPH/DIAG INJ SC/IM: CPT

## 2025-06-12 PROCEDURE — 73080 X-RAY EXAM OF ELBOW: CPT

## 2025-06-12 PROCEDURE — 99284 EMERGENCY DEPT VISIT MOD MDM: CPT

## 2025-06-12 RX ORDER — LIDOCAINE HYDROCHLORIDE 20 MG/ML
1 JELLY TOPICAL ONCE
Refills: 0 | Status: COMPLETED | OUTPATIENT
Start: 2025-06-12 | End: 2025-06-12

## 2025-06-12 RX ORDER — ACETAMINOPHEN 500 MG/5ML
650 LIQUID (ML) ORAL ONCE
Refills: 0 | Status: COMPLETED | OUTPATIENT
Start: 2025-06-12 | End: 2025-06-12

## 2025-06-12 RX ORDER — DIAZEPAM 5 MG/1
2 TABLET ORAL ONCE
Refills: 0 | Status: DISCONTINUED | OUTPATIENT
Start: 2025-06-12 | End: 2025-06-12

## 2025-06-12 RX ORDER — KETOROLAC TROMETHAMINE 30 MG/ML
15 INJECTION, SOLUTION INTRAMUSCULAR; INTRAVENOUS ONCE
Refills: 0 | Status: DISCONTINUED | OUTPATIENT
Start: 2025-06-12 | End: 2025-06-12

## 2025-06-12 RX ADMIN — Medication 650 MILLIGRAM(S): at 10:03

## 2025-06-12 RX ADMIN — KETOROLAC TROMETHAMINE 15 MILLIGRAM(S): 30 INJECTION, SOLUTION INTRAMUSCULAR; INTRAVENOUS at 10:03

## 2025-06-12 RX ADMIN — LIDOCAINE HYDROCHLORIDE 1 PATCH: 20 JELLY TOPICAL at 09:33

## 2025-06-12 RX ADMIN — KETOROLAC TROMETHAMINE 15 MILLIGRAM(S): 30 INJECTION, SOLUTION INTRAMUSCULAR; INTRAVENOUS at 09:33

## 2025-06-12 RX ADMIN — Medication 650 MILLIGRAM(S): at 09:33

## 2025-06-12 RX ADMIN — DIAZEPAM 2 MILLIGRAM(S): 5 TABLET ORAL at 09:33

## 2025-07-03 ENCOUNTER — APPOINTMENT (OUTPATIENT)
Age: 25
End: 2025-07-03
Payer: MEDICAID

## 2025-07-03 PROBLEM — M25.522 LEFT ELBOW PAIN: Status: ACTIVE | Noted: 2025-07-03

## 2025-07-03 PROBLEM — Z83.49 FAMILY HISTORY OF THYROID DYSFUNCTION: Status: ACTIVE | Noted: 2025-07-03

## 2025-07-03 PROBLEM — Z00.00 ENCOUNTER FOR PREVENTIVE HEALTH EXAMINATION: Status: ACTIVE | Noted: 2025-07-03

## 2025-07-03 PROCEDURE — 99204 OFFICE O/P NEW MOD 45 MIN: CPT

## 2025-07-03 RX ORDER — DICLOFENAC SODIUM 75 MG/1
75 TABLET, DELAYED RELEASE ORAL TWICE DAILY
Qty: 30 | Refills: 1 | Status: ACTIVE | COMMUNITY
Start: 2025-07-03 | End: 1900-01-01

## 2025-07-12 ENCOUNTER — APPOINTMENT (OUTPATIENT)
Dept: MRI IMAGING | Facility: CLINIC | Age: 25
End: 2025-07-12
Payer: MEDICAID

## 2025-07-12 PROCEDURE — 73221 MRI JOINT UPR EXTREM W/O DYE: CPT | Mod: LT

## 2025-07-21 ENCOUNTER — APPOINTMENT (OUTPATIENT)
Age: 25
End: 2025-07-21

## 2025-08-15 ENCOUNTER — EMERGENCY (EMERGENCY)
Facility: HOSPITAL | Age: 25
LOS: 1 days | End: 2025-08-15
Attending: EMERGENCY MEDICINE
Payer: MEDICAID

## 2025-08-15 VITALS
SYSTOLIC BLOOD PRESSURE: 106 MMHG | DIASTOLIC BLOOD PRESSURE: 65 MMHG | RESPIRATION RATE: 18 BRPM | HEART RATE: 73 BPM | OXYGEN SATURATION: 99 % | TEMPERATURE: 98 F

## 2025-08-15 VITALS
OXYGEN SATURATION: 96 % | WEIGHT: 293 LBS | TEMPERATURE: 98 F | DIASTOLIC BLOOD PRESSURE: 94 MMHG | RESPIRATION RATE: 18 BRPM | HEIGHT: 64 IN | HEART RATE: 96 BPM | SYSTOLIC BLOOD PRESSURE: 138 MMHG

## 2025-08-15 LAB
ALBUMIN SERPL ELPH-MCNC: 3.3 G/DL — LOW (ref 3.5–5)
ALP SERPL-CCNC: 64 U/L — SIGNIFICANT CHANGE UP (ref 40–120)
ALT FLD-CCNC: 28 U/L DA — SIGNIFICANT CHANGE UP (ref 10–60)
ANION GAP SERPL CALC-SCNC: 1 MMOL/L — LOW (ref 5–17)
AST SERPL-CCNC: 16 U/L — SIGNIFICANT CHANGE UP (ref 10–40)
BASOPHILS # BLD AUTO: 0.03 K/UL — SIGNIFICANT CHANGE UP (ref 0–0.2)
BASOPHILS NFR BLD AUTO: 0.3 % — SIGNIFICANT CHANGE UP (ref 0–2)
BILIRUB SERPL-MCNC: 0.2 MG/DL — SIGNIFICANT CHANGE UP (ref 0.2–1.2)
BUN SERPL-MCNC: 14 MG/DL — SIGNIFICANT CHANGE UP (ref 7–18)
CALCIUM SERPL-MCNC: 8.9 MG/DL — SIGNIFICANT CHANGE UP (ref 8.4–10.5)
CHLORIDE SERPL-SCNC: 105 MMOL/L — SIGNIFICANT CHANGE UP (ref 96–108)
CO2 SERPL-SCNC: 30 MMOL/L — SIGNIFICANT CHANGE UP (ref 22–31)
CREAT SERPL-MCNC: 0.73 MG/DL — SIGNIFICANT CHANGE UP (ref 0.5–1.3)
EGFR: 116 ML/MIN/1.73M2 — SIGNIFICANT CHANGE UP
EGFR: 116 ML/MIN/1.73M2 — SIGNIFICANT CHANGE UP
EOSINOPHIL # BLD AUTO: 0.12 K/UL — SIGNIFICANT CHANGE UP (ref 0–0.5)
EOSINOPHIL NFR BLD AUTO: 1.3 % — SIGNIFICANT CHANGE UP (ref 0–6)
GLUCOSE SERPL-MCNC: 119 MG/DL — HIGH (ref 70–99)
HCG SERPL-ACNC: <1 MIU/ML — SIGNIFICANT CHANGE UP
HCT VFR BLD CALC: 38.8 % — SIGNIFICANT CHANGE UP (ref 34.5–45)
HGB BLD-MCNC: 13.1 G/DL — SIGNIFICANT CHANGE UP (ref 11.5–15.5)
IMM GRANULOCYTES NFR BLD AUTO: 0.3 % — SIGNIFICANT CHANGE UP (ref 0–0.9)
LYMPHOCYTES # BLD AUTO: 2.1 K/UL — SIGNIFICANT CHANGE UP (ref 1–3.3)
LYMPHOCYTES # BLD AUTO: 22.2 % — SIGNIFICANT CHANGE UP (ref 13–44)
MCHC RBC-ENTMCNC: 27.9 PG — SIGNIFICANT CHANGE UP (ref 27–34)
MCHC RBC-ENTMCNC: 33.8 G/DL — SIGNIFICANT CHANGE UP (ref 32–36)
MCV RBC AUTO: 82.7 FL — SIGNIFICANT CHANGE UP (ref 80–100)
MONOCYTES # BLD AUTO: 0.48 K/UL — SIGNIFICANT CHANGE UP (ref 0–0.9)
MONOCYTES NFR BLD AUTO: 5.1 % — SIGNIFICANT CHANGE UP (ref 2–14)
NEUTROPHILS # BLD AUTO: 6.7 K/UL — SIGNIFICANT CHANGE UP (ref 1.8–7.4)
NEUTROPHILS NFR BLD AUTO: 70.8 % — SIGNIFICANT CHANGE UP (ref 43–77)
NRBC BLD AUTO-RTO: 0 /100 WBCS — SIGNIFICANT CHANGE UP (ref 0–0)
PLATELET # BLD AUTO: 318 K/UL — SIGNIFICANT CHANGE UP (ref 150–400)
POTASSIUM SERPL-MCNC: 4.1 MMOL/L — SIGNIFICANT CHANGE UP (ref 3.5–5.3)
POTASSIUM SERPL-SCNC: 4.1 MMOL/L — SIGNIFICANT CHANGE UP (ref 3.5–5.3)
PROT SERPL-MCNC: 7.7 G/DL — SIGNIFICANT CHANGE UP (ref 6–8.3)
RBC # BLD: 4.69 M/UL — SIGNIFICANT CHANGE UP (ref 3.8–5.2)
RBC # FLD: 12.6 % — SIGNIFICANT CHANGE UP (ref 10.3–14.5)
SODIUM SERPL-SCNC: 136 MMOL/L — SIGNIFICANT CHANGE UP (ref 135–145)
WBC # BLD: 9.46 K/UL — SIGNIFICANT CHANGE UP (ref 3.8–10.5)
WBC # FLD AUTO: 9.46 K/UL — SIGNIFICANT CHANGE UP (ref 3.8–10.5)

## 2025-08-15 PROCEDURE — 84702 CHORIONIC GONADOTROPIN TEST: CPT

## 2025-08-15 PROCEDURE — 99284 EMERGENCY DEPT VISIT MOD MDM: CPT

## 2025-08-15 PROCEDURE — 93005 ELECTROCARDIOGRAM TRACING: CPT

## 2025-08-15 PROCEDURE — 93010 ELECTROCARDIOGRAM REPORT: CPT

## 2025-08-15 PROCEDURE — 85025 COMPLETE CBC W/AUTO DIFF WBC: CPT

## 2025-08-15 PROCEDURE — 80053 COMPREHEN METABOLIC PANEL: CPT

## 2025-08-15 PROCEDURE — 36415 COLL VENOUS BLD VENIPUNCTURE: CPT

## 2025-08-15 RX ADMIN — Medication 1000 MILLILITER(S): at 04:05

## 2025-08-18 ENCOUNTER — APPOINTMENT (OUTPATIENT)
Age: 25
End: 2025-08-18
Payer: MEDICAID

## 2025-08-18 VITALS
HEIGHT: 65 IN | WEIGHT: 293 LBS | SYSTOLIC BLOOD PRESSURE: 143 MMHG | OXYGEN SATURATION: 95 % | HEART RATE: 86 BPM | DIASTOLIC BLOOD PRESSURE: 87 MMHG | BODY MASS INDEX: 48.82 KG/M2

## 2025-08-18 DIAGNOSIS — M77.8 OTHER ENTHESOPATHIES, NOT ELSEWHERE CLASSIFIED: ICD-10-CM

## 2025-08-18 PROCEDURE — 99213 OFFICE O/P EST LOW 20 MIN: CPT
